# Patient Record
Sex: MALE | Race: WHITE | NOT HISPANIC OR LATINO | URBAN - METROPOLITAN AREA
[De-identification: names, ages, dates, MRNs, and addresses within clinical notes are randomized per-mention and may not be internally consistent; named-entity substitution may affect disease eponyms.]

---

## 2023-04-06 DIAGNOSIS — C18.9 COLON CANCER: Primary | ICD-10-CM

## 2023-05-04 ENCOUNTER — OFFICE VISIT (OUTPATIENT)
Dept: HEMATOLOGY/ONCOLOGY | Facility: CLINIC | Age: 68
End: 2023-05-04
Payer: MEDICARE

## 2023-05-04 VITALS
RESPIRATION RATE: 18 BRPM | BODY MASS INDEX: 32.44 KG/M2 | TEMPERATURE: 99 F | DIASTOLIC BLOOD PRESSURE: 53 MMHG | HEIGHT: 66 IN | HEART RATE: 63 BPM | OXYGEN SATURATION: 96 % | SYSTOLIC BLOOD PRESSURE: 102 MMHG | WEIGHT: 201.88 LBS

## 2023-05-04 DIAGNOSIS — C20 RECTAL CANCER: ICD-10-CM

## 2023-05-04 DIAGNOSIS — C18.9 COLON CANCER: ICD-10-CM

## 2023-05-04 PROCEDURE — 4010F PR ACE/ARB THEARPY RXD/TAKEN: ICD-10-PCS | Mod: CPTII,,, | Performed by: STUDENT IN AN ORGANIZED HEALTH CARE EDUCATION/TRAINING PROGRAM

## 2023-05-04 PROCEDURE — 1101F PT FALLS ASSESS-DOCD LE1/YR: CPT | Mod: CPTII,,, | Performed by: STUDENT IN AN ORGANIZED HEALTH CARE EDUCATION/TRAINING PROGRAM

## 2023-05-04 PROCEDURE — 3074F PR MOST RECENT SYSTOLIC BLOOD PRESSURE < 130 MM HG: ICD-10-PCS | Mod: CPTII,,, | Performed by: STUDENT IN AN ORGANIZED HEALTH CARE EDUCATION/TRAINING PROGRAM

## 2023-05-04 PROCEDURE — 3008F BODY MASS INDEX DOCD: CPT | Mod: CPTII,,, | Performed by: STUDENT IN AN ORGANIZED HEALTH CARE EDUCATION/TRAINING PROGRAM

## 2023-05-04 PROCEDURE — 1159F PR MEDICATION LIST DOCUMENTED IN MEDICAL RECORD: ICD-10-PCS | Mod: CPTII,,, | Performed by: STUDENT IN AN ORGANIZED HEALTH CARE EDUCATION/TRAINING PROGRAM

## 2023-05-04 PROCEDURE — 99205 PR OFFICE/OUTPT VISIT, NEW, LEVL V, 60-74 MIN: ICD-10-PCS | Mod: ,,, | Performed by: STUDENT IN AN ORGANIZED HEALTH CARE EDUCATION/TRAINING PROGRAM

## 2023-05-04 PROCEDURE — 1125F PR PAIN SEVERITY QUANTIFIED, PAIN PRESENT: ICD-10-PCS | Mod: CPTII,,, | Performed by: STUDENT IN AN ORGANIZED HEALTH CARE EDUCATION/TRAINING PROGRAM

## 2023-05-04 PROCEDURE — 3078F PR MOST RECENT DIASTOLIC BLOOD PRESSURE < 80 MM HG: ICD-10-PCS | Mod: CPTII,,, | Performed by: STUDENT IN AN ORGANIZED HEALTH CARE EDUCATION/TRAINING PROGRAM

## 2023-05-04 PROCEDURE — 3008F PR BODY MASS INDEX (BMI) DOCUMENTED: ICD-10-PCS | Mod: CPTII,,, | Performed by: STUDENT IN AN ORGANIZED HEALTH CARE EDUCATION/TRAINING PROGRAM

## 2023-05-04 PROCEDURE — 1101F PR PT FALLS ASSESS DOC 0-1 FALLS W/OUT INJ PAST YR: ICD-10-PCS | Mod: CPTII,,, | Performed by: STUDENT IN AN ORGANIZED HEALTH CARE EDUCATION/TRAINING PROGRAM

## 2023-05-04 PROCEDURE — 1159F MED LIST DOCD IN RCRD: CPT | Mod: CPTII,,, | Performed by: STUDENT IN AN ORGANIZED HEALTH CARE EDUCATION/TRAINING PROGRAM

## 2023-05-04 PROCEDURE — 3074F SYST BP LT 130 MM HG: CPT | Mod: CPTII,,, | Performed by: STUDENT IN AN ORGANIZED HEALTH CARE EDUCATION/TRAINING PROGRAM

## 2023-05-04 PROCEDURE — 3288F PR FALLS RISK ASSESSMENT DOCUMENTED: ICD-10-PCS | Mod: CPTII,,, | Performed by: STUDENT IN AN ORGANIZED HEALTH CARE EDUCATION/TRAINING PROGRAM

## 2023-05-04 PROCEDURE — 3078F DIAST BP <80 MM HG: CPT | Mod: CPTII,,, | Performed by: STUDENT IN AN ORGANIZED HEALTH CARE EDUCATION/TRAINING PROGRAM

## 2023-05-04 PROCEDURE — 99205 OFFICE O/P NEW HI 60 MIN: CPT | Mod: ,,, | Performed by: STUDENT IN AN ORGANIZED HEALTH CARE EDUCATION/TRAINING PROGRAM

## 2023-05-04 PROCEDURE — 1125F AMNT PAIN NOTED PAIN PRSNT: CPT | Mod: CPTII,,, | Performed by: STUDENT IN AN ORGANIZED HEALTH CARE EDUCATION/TRAINING PROGRAM

## 2023-05-04 PROCEDURE — 4010F ACE/ARB THERAPY RXD/TAKEN: CPT | Mod: CPTII,,, | Performed by: STUDENT IN AN ORGANIZED HEALTH CARE EDUCATION/TRAINING PROGRAM

## 2023-05-04 PROCEDURE — 3288F FALL RISK ASSESSMENT DOCD: CPT | Mod: CPTII,,, | Performed by: STUDENT IN AN ORGANIZED HEALTH CARE EDUCATION/TRAINING PROGRAM

## 2023-05-04 RX ORDER — ALBUTEROL SULFATE 90 UG/1
1 AEROSOL, METERED RESPIRATORY (INHALATION) EVERY 4 HOURS PRN
COMMUNITY
Start: 2023-04-04

## 2023-05-04 RX ORDER — ALBUTEROL SULFATE 1.25 MG/3ML
SOLUTION RESPIRATORY (INHALATION) EVERY 8 HOURS
COMMUNITY
Start: 2022-11-17

## 2023-05-04 RX ORDER — FLUTICASONE PROPIONATE 50 MCG
SPRAY, SUSPENSION (ML) NASAL
COMMUNITY
Start: 2023-04-21

## 2023-05-04 RX ORDER — FERROUS SULFATE TAB 325 MG (65 MG ELEMENTAL FE) 325 (65 FE) MG
TAB ORAL
COMMUNITY
Start: 2023-03-01

## 2023-05-04 RX ORDER — LISINOPRIL 10 MG/1
10 TABLET ORAL
COMMUNITY
Start: 2023-04-18

## 2023-05-04 RX ORDER — FOLIC ACID 1 MG/1
1000 TABLET ORAL
COMMUNITY
Start: 2023-04-27

## 2023-05-04 RX ORDER — APIXABAN 5 MG/1
5 TABLET, FILM COATED ORAL 2 TIMES DAILY
COMMUNITY
Start: 2023-04-13

## 2023-05-04 RX ORDER — AMIODARONE HYDROCHLORIDE 200 MG/1
200 TABLET ORAL
COMMUNITY
Start: 2023-04-13

## 2023-05-04 RX ORDER — PRAVASTATIN SODIUM 40 MG/1
40 TABLET ORAL
COMMUNITY
Start: 2023-04-24

## 2023-05-04 RX ORDER — TIOTROPIUM BROMIDE AND OLODATEROL 3.124; 2.736 UG/1; UG/1
SPRAY, METERED RESPIRATORY (INHALATION)
COMMUNITY
Start: 2023-04-21

## 2023-05-04 RX ORDER — FUROSEMIDE 40 MG/1
TABLET ORAL
COMMUNITY
Start: 2023-03-25

## 2023-05-04 NOTE — PROGRESS NOTES
Referring provider : Dr. Morse   Reason for consultation : Colon cancer     Diagnosis:  Rectal adenocarcinoma, pT3 pN2b MX, at least stage IIIC.      Treatment history    01/23/2023: low anterior resection    Current treatment: N/A    HPI     67-year-old who had a screening colonoscopy in January 2023 when he was found to have a obstructing mass at 12 cm, biopsy from which revealed invasive adenocarcinoma, concerning for malignancy.  He underwent a low anterior resection on 01/23/2023, pathology from which revealed invasive adenocarcinoma, pT3 pN2b.  Patient's postop course was complicated with anastomotic leak assess delayed wound healing.  He was referred to our clinic to discuss further treatment recommendations given his diagnosis of stage III rectal cancer.  Patient presented to my clinic on 05/04/2023 to establish care.    Interval history     Today, 05/04/2023, patient denies any acute concerns except for mild abdominal pain which is controlled on tramadol.  He denies any other acute concerns.  He denies any blood in his ostomy.  He denies any decreased appetite, weight loss, new foci of pain.      Pathology  01/30/2023 upper rectum/lower sigmoid colon resection:  Invasive adenocarcinoma, moderately differentiated, tumor invading the perirectal soft tissue, margins negative for tumor.  Ten of 38 lymph nodes positive for metastatic disease.  [id number]      02/06/2023 sigmoid colon resection-portion of perforated colon showing focal mucosal ischemia, vascular congestion, acute inflammation hemorrhage and acute serositis.  Surgical margins showing viable colon with acute serositis.  Three benign lymph nodes negative for tumor.  Negative for malignancy.      Imaging    02/08/23: Recent  intestinal surgery with placement of left sided colostomy and a surgical drain.  Free intraperitoneal air has resolved although scattered free fluid remains.  There is no bowel obstruction or significant bowel dilatation.  No  abscess of other organized fluid collection.  New small pleural effusions with greater atelectasis in each lung base.  Superimposed lower lobe infiltrate is possible bilaterally.  Worsening body wall edema.      Past Medical History:   Diagnosis Date    Colon cancer     Hypertension     Mixed hyperlipidemia        History reviewed. No pertinent surgical history.    History reviewed. No pertinent family history.    Social History     Socioeconomic History    Marital status:        Current Outpatient Medications   Medication Sig Dispense Refill    albuterol (ACCUNEB) 1.25 mg/3 mL Nebu Take by nebulization every 8 (eight) hours.      albuterol (PROVENTIL/VENTOLIN HFA) 90 mcg/actuation inhaler 1 puff every 4 (four) hours as needed.      amiodarone (PACERONE) 200 MG Tab Take 200 mg by mouth.      ELIQUIS 5 mg Tab Take 5 mg by mouth 2 (two) times daily.      FEROSUL 325 mg (65 mg iron) Tab tablet Take by mouth.      fluticasone propionate (FLONASE) 50 mcg/actuation nasal spray SMARTSI Spray(s) Both Nares Every Morning      folic acid (FOLVITE) 1 MG tablet Take 1,000 mcg by mouth.      furosemide (LASIX) 40 MG tablet SMARTSI Tablet(s) By Mouth Twice a Week PRN      lisinopriL 10 MG tablet Take 10 mg by mouth.      pravastatin (PRAVACHOL) 40 MG tablet Take 40 mg by mouth.      STIOLTO RESPIMAT 2.5-2.5 mcg/actuation Mist SMARTSI Puff(s) Via Inhaler Daily       No current facility-administered medications for this visit.       Review of patient's allergies indicates:  No Known Allergies      Review of systems     CONSTITUTIONAL: no fevers, no chills, no weight loss, no fatigue, no weakness  HEMATOLOGIC: no abnormal bleeding, no abnormal bruising, no drenching night sweats  ONCOLOGIC: no new masses or lumps  HEENT: no vision loss, no tinnitus or hearing loss, no nose bleeding, no dysphagia, no odynophagia  CVS: no chest pain, no palpitations, no dyspnea on exertion  RESP: no shortness of breath, no hemoptysis,  no cough  GI: no nausea, no vomiting, no diarrhea, no constipation, no melena, no hematochezia, no hematemesis, no abdominal pain, no increase in abdominal girth  : no dysuria, no hematuria, no hesitancy, no scrotal swelling, no discharge  INTEGUMENT: no rashes, no abnormal bruising, no nail pitting, no hyperpigmentation  NEURO: no falls, no memory loss, no paresthesias or dysesthesias, no urofecal incontinence or retention, no loss of strength on any extremity  MSK: no back pain, no new joint pain, no joint swelling  PSYCH: no suicidal or homicidal ideation, no depression, no insomnia, no anhedonia  ENDOCRINE: no heat or cold intolerance, no polyuria, no polydipsia      Physical exam     Vitals:    05/04/23 1145   BP: (!) 102/53   Pulse:    Resp:    Temp:      ECOG PS 0  GA: AAOx3, NAD, walks with a walker  HEENT: NCAT, moist oral mucous membranes  LYMPH: no cervical, axillary or supraclavicular adenopathy  CVS: s1s2 RRR, no M/R/G  RESP: CTA b/l, no crackles, no wheezes or rhonchi  ABD: soft, NT, ND, BS+, no hepatosplenomegaly, ostomy in place, healthy stoma.  In incision site with healing shallow wound.  No discharge or surrounding edema.  EXT: no deformities, no pedal edema  SKIN: no rashes, warm and dry  NEURO: normal mentation, strength 5/5 on all 4 extremities, no sensory deficits        Assessment and plan     # Rectal adenocarcinoma, pT3 pN2b MX, at least stage IIIC.  Noted operative report from January 2023 with findings of a palpable mass at peritoneal reflection.  Patient had surgical complications with concerns for perforation since anastomotic leak in February 2023 during another surgery status post sigmoid colon resection.  Status post low anterior resection, anastomotic disruption diverting colostomy, with delayed wound healing.  Discussed with patient and his daughter the diagnosis of rectal adenocarcinoma, treatment recommendations including chemotherapy, chemoradiation therapy and surgical  resection.  Patient has had complications from his surgical resection , now with delayed wound healing.  He was 13 weeks postop during his initial consultation with me.    I do not believe he would benefit from any adjuvant chemotherapy or chemoradiation therapy at this point especially since he is still healing.    I will get an expedited radiation oncology consultation for their opinion as well.  Discussed the plan to obtain restaging scans with a CT chest abdomen pelvis IV contrast and blood work including CEA today        Plan   CT chest abdomen pelvis with IV contrast   CBC, CMP, CEA today  Radiation oncology referral   Plan to follow-up in 2-3 weeks to discuss above workup and further recommendations.      A total of  60 minutes were spent in review of records, interpretation of test, coordination of care, discussion and counseling with the patient.          Portions of the record may have been created with voice recognition software. Occasional wrong-word or sound-a-like substitutions may have occurred due to the inherent limitations of voice recognition software. Read the chart carefully and recognize, using context, where substitutions have occurred.

## 2023-05-15 ENCOUNTER — OFFICE VISIT (OUTPATIENT)
Dept: HEMATOLOGY/ONCOLOGY | Facility: CLINIC | Age: 68
End: 2023-05-15
Payer: MEDICARE

## 2023-05-15 VITALS
HEART RATE: 66 BPM | HEIGHT: 66 IN | OXYGEN SATURATION: 99 % | DIASTOLIC BLOOD PRESSURE: 55 MMHG | BODY MASS INDEX: 30.44 KG/M2 | WEIGHT: 189.38 LBS | SYSTOLIC BLOOD PRESSURE: 92 MMHG

## 2023-05-15 DIAGNOSIS — C20 RECTAL CANCER: Primary | ICD-10-CM

## 2023-05-15 PROCEDURE — 3074F SYST BP LT 130 MM HG: CPT | Mod: CPTII,,, | Performed by: NURSE PRACTITIONER

## 2023-05-15 PROCEDURE — 4010F PR ACE/ARB THEARPY RXD/TAKEN: ICD-10-PCS | Mod: CPTII,,, | Performed by: NURSE PRACTITIONER

## 2023-05-15 PROCEDURE — 1126F AMNT PAIN NOTED NONE PRSNT: CPT | Mod: CPTII,,, | Performed by: NURSE PRACTITIONER

## 2023-05-15 PROCEDURE — 1101F PT FALLS ASSESS-DOCD LE1/YR: CPT | Mod: CPTII,,, | Performed by: NURSE PRACTITIONER

## 2023-05-15 PROCEDURE — 3288F FALL RISK ASSESSMENT DOCD: CPT | Mod: CPTII,,, | Performed by: NURSE PRACTITIONER

## 2023-05-15 PROCEDURE — 3288F PR FALLS RISK ASSESSMENT DOCUMENTED: ICD-10-PCS | Mod: CPTII,,, | Performed by: NURSE PRACTITIONER

## 2023-05-15 PROCEDURE — 1101F PR PT FALLS ASSESS DOC 0-1 FALLS W/OUT INJ PAST YR: ICD-10-PCS | Mod: CPTII,,, | Performed by: NURSE PRACTITIONER

## 2023-05-15 PROCEDURE — 3078F PR MOST RECENT DIASTOLIC BLOOD PRESSURE < 80 MM HG: ICD-10-PCS | Mod: CPTII,,, | Performed by: NURSE PRACTITIONER

## 2023-05-15 PROCEDURE — 1160F PR REVIEW ALL MEDS BY PRESCRIBER/CLIN PHARMACIST DOCUMENTED: ICD-10-PCS | Mod: CPTII,,, | Performed by: NURSE PRACTITIONER

## 2023-05-15 PROCEDURE — 1159F MED LIST DOCD IN RCRD: CPT | Mod: CPTII,,, | Performed by: NURSE PRACTITIONER

## 2023-05-15 PROCEDURE — 3008F BODY MASS INDEX DOCD: CPT | Mod: CPTII,,, | Performed by: NURSE PRACTITIONER

## 2023-05-15 PROCEDURE — 99214 PR OFFICE/OUTPT VISIT, EST, LEVL IV, 30-39 MIN: ICD-10-PCS | Mod: ,,, | Performed by: NURSE PRACTITIONER

## 2023-05-15 PROCEDURE — 1160F RVW MEDS BY RX/DR IN RCRD: CPT | Mod: CPTII,,, | Performed by: NURSE PRACTITIONER

## 2023-05-15 PROCEDURE — 3078F DIAST BP <80 MM HG: CPT | Mod: CPTII,,, | Performed by: NURSE PRACTITIONER

## 2023-05-15 PROCEDURE — 99214 OFFICE O/P EST MOD 30 MIN: CPT | Mod: ,,, | Performed by: NURSE PRACTITIONER

## 2023-05-15 PROCEDURE — 3074F PR MOST RECENT SYSTOLIC BLOOD PRESSURE < 130 MM HG: ICD-10-PCS | Mod: CPTII,,, | Performed by: NURSE PRACTITIONER

## 2023-05-15 PROCEDURE — 1126F PR PAIN SEVERITY QUANTIFIED, NO PAIN PRESENT: ICD-10-PCS | Mod: CPTII,,, | Performed by: NURSE PRACTITIONER

## 2023-05-15 PROCEDURE — 3008F PR BODY MASS INDEX (BMI) DOCUMENTED: ICD-10-PCS | Mod: CPTII,,, | Performed by: NURSE PRACTITIONER

## 2023-05-15 PROCEDURE — 4010F ACE/ARB THERAPY RXD/TAKEN: CPT | Mod: CPTII,,, | Performed by: NURSE PRACTITIONER

## 2023-05-15 PROCEDURE — 1159F PR MEDICATION LIST DOCUMENTED IN MEDICAL RECORD: ICD-10-PCS | Mod: CPTII,,, | Performed by: NURSE PRACTITIONER

## 2023-05-15 NOTE — PROGRESS NOTES
Referring provider : Dr. Morse   Reason for consultation : Colon cancer     Diagnosis:  Rectal adenocarcinoma, pT3 pN2b MX, at least stage IIIC.      Treatment history    01/23/2023: low anterior resection    Current treatment: N/A    HPI     67-year-old who had a screening colonoscopy in January 2023 when he was found to have a obstructing mass at 12 cm, biopsy from which revealed invasive adenocarcinoma, concerning for malignancy.  He underwent a low anterior resection on 01/23/2023, pathology from which revealed invasive adenocarcinoma, pT3 pN2b.  Patient's postop course was complicated with anastomotic leak assess delayed wound healing.  He was referred to our clinic to discuss further treatment recommendations given his diagnosis of stage III rectal cancer.  Patient presented to my clinic on 05/04/2023 to establish care.    Interval history     Today, 05/15/2023, patient denies any acute concerns except for mild abdominal pain which is controlled on tramadol.  He denies any other acute concerns.  He denies any blood in his ostomy.  He denies any decreased appetite, weight loss, new foci of pain.      Pathology  01/30/2023 upper rectum/lower sigmoid colon resection:  Invasive adenocarcinoma, moderately differentiated, tumor invading the perirectal soft tissue, margins negative for tumor.  Ten of 38 lymph nodes positive for metastatic disease.  [id number]      02/06/2023 sigmoid colon resection-portion of perforated colon showing focal mucosal ischemia, vascular congestion, acute inflammation hemorrhage and acute serositis.  Surgical margins showing viable colon with acute serositis.  Three benign lymph nodes negative for tumor.  Negative for malignancy.      Imaging    02/08/23: Recent  intestinal surgery with placement of left sided colostomy and a surgical drain.  Free intraperitoneal air has resolved although scattered free fluid remains.  There is no bowel obstruction or significant bowel dilatation.  No  abscess of other organized fluid collection.  New small pleural effusions with greater atelectasis in each lung base.  Superimposed lower lobe infiltrate is possible bilaterally.  Worsening body wall edema.  05/10/23: CT CAP: chest: There are small bilateral pleural effusions and associated compressive atelectasis and minimal pericardial effusion. No other significant change. No evidence of metastatic disease to the chest. AP: Persistent air and fluid collection in the left lower quadrant intimately associated with the posterior margin of the adjacent distal colon as well as the region of the surgical suture in the distal colonic segment. 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       Past Medical History:   Diagnosis Date    Colon cancer     Hypertension     Mixed hyperlipidemia        History reviewed. No pertinent surgical history.    History reviewed. No pertinent family history.    Social History     Socioeconomic History    Marital status:    Tobacco Use    Smoking status: Never    Smokeless tobacco: Never   Substance and Sexual Activity    Alcohol use: Not Currently    Drug use: Never       Current Outpatient Medications   Medication Sig Dispense Refill    albuterol (ACCUNEB) 1.25 mg/3 mL Nebu Take by nebulization every 8 (eight) hours.      albuterol (PROVENTIL/VENTOLIN HFA) 90 mcg/actuation inhaler 1 puff every 4 (four) hours as needed.      amiodarone (PACERONE) 200 MG Tab Take 200 mg by mouth.      ELIQUIS 5 mg Tab Take 5 mg by mouth 2 (two) times daily.      FEROSUL 325 mg (65 mg iron) Tab tablet Take by mouth.      fluticasone propionate (FLONASE) 50 mcg/actuation nasal spray SMARTSI Spray(s) Both Nares Every Morning      folic acid (FOLVITE) 1 MG tablet Take 1,000 mcg by mouth.      furosemide (LASIX) 40 MG tablet SMARTSI Tablet(s) By Mouth Twice a Week PRN      lisinopriL 10  MG tablet Take 10 mg by mouth.      pravastatin (PRAVACHOL) 40 MG tablet Take 40 mg by mouth.      STIOLTO RESPIMAT 2.5-2.5 mcg/actuation Mist SMARTSI Puff(s) Via Inhaler Daily       No current facility-administered medications for this visit.       Review of patient's allergies indicates:  No Known Allergies      Review of systems     CONSTITUTIONAL: no fevers, no chills, no weight loss, no fatigue, no weakness  HEMATOLOGIC: no abnormal bleeding, no abnormal bruising, no drenching night sweats  ONCOLOGIC: no new masses or lumps  HEENT: no vision loss, no tinnitus or hearing loss, no nose bleeding, no dysphagia, no odynophagia  CVS: no chest pain, no palpitations, no dyspnea on exertion  RESP: no shortness of breath, no hemoptysis, no cough  GI: no nausea, no vomiting, no diarrhea, no constipation, no melena, no hematochezia, no hematemesis, no abdominal pain, no increase in abdominal girth  : no dysuria, no hematuria, no hesitancy, no scrotal swelling, no discharge  INTEGUMENT: no rashes, no abnormal bruising, no nail pitting, no hyperpigmentation  NEURO: no falls, no memory loss, no paresthesias or dysesthesias, no urofecal incontinence or retention, no loss of strength on any extremity  MSK: no back pain, no new joint pain, no joint swelling  PSYCH: no suicidal or homicidal ideation, no depression, no insomnia, no anhedonia  ENDOCRINE: no heat or cold intolerance, no polyuria, no polydipsia      Physical exam     Vitals:    05/15/23 0937   BP: (!) 92/55   Pulse:      ECOG PS 0  GA: AAOx3, NAD, walks with a walker  HEENT: NCAT, moist oral mucous membranes  LYMPH: no cervical, axillary or supraclavicular adenopathy  CVS: s1s2 RRR, no M/R/G  RESP: CTA b/l, no crackles, no wheezes or rhonchi  ABD: soft, NT, ND, BS+, no hepatosplenomegaly, ostomy in place, healthy stoma.  In incision site with healing shallow wound.  No discharge or surrounding edema.  EXT: no deformities, no pedal edema  SKIN: no rashes, warm  and dry  NEURO: normal mentation, strength 5/5 on all 4 extremities, no sensory deficits        Assessment and plan     # Rectal adenocarcinoma, pT3 pN2b MX, at least stage IIIC.  Noted operative report from January 2023 with findings of a palpable mass at peritoneal reflection.  Patient had surgical complications with concerns for perforation since anastomotic leak in February 2023 during another surgery status post sigmoid colon resection.  Status post low anterior resection, anastomotic disruption diverting colostomy, with delayed wound healing.  Discussed with patient and his daughter the diagnosis of rectal adenocarcinoma, treatment recommendations including chemotherapy, chemoradiation therapy and surgical resection.  Patient has had complications from his surgical resection , now with delayed wound healing.  He was 13 weeks postop during his initial consultation with me.    I do not believe he would benefit from any adjuvant chemotherapy or chemoradiation therapy at this point especially since he is still healing.    I will get an expedited radiation oncology consultation for their opinion as well.  Discussed the plan to obtain restaging scans with a CT chest abdomen pelvis IV contrast and blood work including CEA today        Plan   Pt will let us know if he decides to pursue receiving radiation  Will return for chemo education- Xeloda once radiation schedule has been established   Seen by Radiation oncology has not started pt needs an abscess drained by IR   Low BP- asymptomatic- he has follow-up with cardiologist tomorrow 5/16/23  Plan to follow-up in 2-3 weeks to discuss above workup and further recommendations.        Pt instructed to call in the interim for any new or worsening concerns or symptoms      MARGARETTE Tracy

## 2023-06-05 ENCOUNTER — OFFICE VISIT (OUTPATIENT)
Dept: HEMATOLOGY/ONCOLOGY | Facility: CLINIC | Age: 68
End: 2023-06-05
Payer: MEDICARE

## 2023-06-05 VITALS
HEIGHT: 66 IN | TEMPERATURE: 99 F | SYSTOLIC BLOOD PRESSURE: 125 MMHG | WEIGHT: 197.88 LBS | HEART RATE: 59 BPM | DIASTOLIC BLOOD PRESSURE: 65 MMHG | OXYGEN SATURATION: 98 % | BODY MASS INDEX: 31.8 KG/M2 | RESPIRATION RATE: 18 BRPM

## 2023-06-05 DIAGNOSIS — C20 RECTAL CANCER: Primary | ICD-10-CM

## 2023-06-05 PROCEDURE — 4010F PR ACE/ARB THEARPY RXD/TAKEN: ICD-10-PCS | Mod: CPTII,,, | Performed by: STUDENT IN AN ORGANIZED HEALTH CARE EDUCATION/TRAINING PROGRAM

## 2023-06-05 PROCEDURE — 3008F PR BODY MASS INDEX (BMI) DOCUMENTED: ICD-10-PCS | Mod: CPTII,,, | Performed by: STUDENT IN AN ORGANIZED HEALTH CARE EDUCATION/TRAINING PROGRAM

## 2023-06-05 PROCEDURE — 3288F FALL RISK ASSESSMENT DOCD: CPT | Mod: CPTII,,, | Performed by: STUDENT IN AN ORGANIZED HEALTH CARE EDUCATION/TRAINING PROGRAM

## 2023-06-05 PROCEDURE — 1159F PR MEDICATION LIST DOCUMENTED IN MEDICAL RECORD: ICD-10-PCS | Mod: CPTII,,, | Performed by: STUDENT IN AN ORGANIZED HEALTH CARE EDUCATION/TRAINING PROGRAM

## 2023-06-05 PROCEDURE — 99214 OFFICE O/P EST MOD 30 MIN: CPT | Mod: ,,, | Performed by: STUDENT IN AN ORGANIZED HEALTH CARE EDUCATION/TRAINING PROGRAM

## 2023-06-05 PROCEDURE — 3288F PR FALLS RISK ASSESSMENT DOCUMENTED: ICD-10-PCS | Mod: CPTII,,, | Performed by: STUDENT IN AN ORGANIZED HEALTH CARE EDUCATION/TRAINING PROGRAM

## 2023-06-05 PROCEDURE — 3074F SYST BP LT 130 MM HG: CPT | Mod: CPTII,,, | Performed by: STUDENT IN AN ORGANIZED HEALTH CARE EDUCATION/TRAINING PROGRAM

## 2023-06-05 PROCEDURE — 1101F PT FALLS ASSESS-DOCD LE1/YR: CPT | Mod: CPTII,,, | Performed by: STUDENT IN AN ORGANIZED HEALTH CARE EDUCATION/TRAINING PROGRAM

## 2023-06-05 PROCEDURE — 3074F PR MOST RECENT SYSTOLIC BLOOD PRESSURE < 130 MM HG: ICD-10-PCS | Mod: CPTII,,, | Performed by: STUDENT IN AN ORGANIZED HEALTH CARE EDUCATION/TRAINING PROGRAM

## 2023-06-05 PROCEDURE — 1125F PR PAIN SEVERITY QUANTIFIED, PAIN PRESENT: ICD-10-PCS | Mod: CPTII,,, | Performed by: STUDENT IN AN ORGANIZED HEALTH CARE EDUCATION/TRAINING PROGRAM

## 2023-06-05 PROCEDURE — 99214 PR OFFICE/OUTPT VISIT, EST, LEVL IV, 30-39 MIN: ICD-10-PCS | Mod: ,,, | Performed by: STUDENT IN AN ORGANIZED HEALTH CARE EDUCATION/TRAINING PROGRAM

## 2023-06-05 PROCEDURE — 3078F PR MOST RECENT DIASTOLIC BLOOD PRESSURE < 80 MM HG: ICD-10-PCS | Mod: CPTII,,, | Performed by: STUDENT IN AN ORGANIZED HEALTH CARE EDUCATION/TRAINING PROGRAM

## 2023-06-05 PROCEDURE — 3078F DIAST BP <80 MM HG: CPT | Mod: CPTII,,, | Performed by: STUDENT IN AN ORGANIZED HEALTH CARE EDUCATION/TRAINING PROGRAM

## 2023-06-05 PROCEDURE — 4010F ACE/ARB THERAPY RXD/TAKEN: CPT | Mod: CPTII,,, | Performed by: STUDENT IN AN ORGANIZED HEALTH CARE EDUCATION/TRAINING PROGRAM

## 2023-06-05 PROCEDURE — 1159F MED LIST DOCD IN RCRD: CPT | Mod: CPTII,,, | Performed by: STUDENT IN AN ORGANIZED HEALTH CARE EDUCATION/TRAINING PROGRAM

## 2023-06-05 PROCEDURE — 1125F AMNT PAIN NOTED PAIN PRSNT: CPT | Mod: CPTII,,, | Performed by: STUDENT IN AN ORGANIZED HEALTH CARE EDUCATION/TRAINING PROGRAM

## 2023-06-05 PROCEDURE — 1101F PR PT FALLS ASSESS DOC 0-1 FALLS W/OUT INJ PAST YR: ICD-10-PCS | Mod: CPTII,,, | Performed by: STUDENT IN AN ORGANIZED HEALTH CARE EDUCATION/TRAINING PROGRAM

## 2023-06-05 PROCEDURE — 3008F BODY MASS INDEX DOCD: CPT | Mod: CPTII,,, | Performed by: STUDENT IN AN ORGANIZED HEALTH CARE EDUCATION/TRAINING PROGRAM

## 2023-06-05 RX ORDER — FERROUS SULFATE 325(65) MG
TABLET, DELAYED RELEASE (ENTERIC COATED) ORAL
COMMUNITY
Start: 2022-07-22

## 2023-06-05 RX ORDER — TOPIRAMATE 100 MG/1
TABLET, FILM COATED ORAL
COMMUNITY

## 2023-06-05 RX ORDER — COLLAGENASE SANTYL 250 [ARB'U]/G
OINTMENT TOPICAL
COMMUNITY
Start: 2023-04-18

## 2023-06-05 RX ORDER — ALPRAZOLAM 0.5 MG/1
TABLET ORAL
COMMUNITY
Start: 2023-03-28

## 2023-06-05 NOTE — PROGRESS NOTES
Referring provider : Dr. Morse   Reason for consultation : Colon cancer     Diagnosis:  Rectal adenocarcinoma, pT3 pN2b MX, at least stage IIIC.      Treatment history    01/23/2023: low anterior resection    Current treatment: N/A    HPI     67-year-old who had a screening colonoscopy in January 2023 when he was found to have a obstructing mass at 12 cm, biopsy from which revealed invasive adenocarcinoma, concerning for malignancy.  He underwent a low anterior resection on 01/23/2023, pathology from which revealed invasive adenocarcinoma, pT3 pN2b.  Patient's postop course was complicated with anastomotic leak assess delayed wound healing.  He was referred to our clinic to discuss further treatment recommendations given his diagnosis of stage III rectal cancer.  Patient presented to my clinic on 05/04/2023 to establish care.    Interval history     Since his initial visit with me, patient was seen by radiation oncology with recommendations for concurrent chemo RT.  Following this he was found to have a abscess requiring drainage tube placement with IR.  Today, 06/05/2023, patient denies any acute concerns today.  He reports that his drain was pulled by a bit last week.  He denies any new foci of pain.  He reports persistent purulent discharge in his drain.  He denies any fevers, chills or change in his bowel habits or blood in his stools.    Pathology  01/30/2023 upper rectum/lower sigmoid colon resection:  Invasive adenocarcinoma, moderately differentiated, tumor invading the perirectal soft tissue, margins negative for tumor.  Ten of 38 lymph nodes positive for metastatic disease.  [id number]      02/06/2023 sigmoid colon resection-portion of perforated colon showing focal mucosal ischemia, vascular congestion, acute inflammation hemorrhage and acute serositis.  Surgical margins showing viable colon with acute serositis.  Three benign lymph nodes negative for tumor.  Negative for  malignancy.      Imaging    05/10/2023 CT chest abdomen pelvis with contrast:  Persistent air and fluid collection in the left anterior lower quadrant intermittently with posterior margin of the adjacent distal colon as well as region of the surgical suture in the distal colonic segment.  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  Overall there is more air unless fluid in this region in comparison to prior exam.  Development of borderline bilateral inguinal and mild retroperitoneal adenopathy.  Nonspecific wall thickening involving the distal colonic segment leading to left sided ostomy.  Possibility of colitis is considered.  This may be reactive secondary to adjacent inflammatory process.  No other significant change.    02/08/23: Recent  intestinal surgery with placement of left sided colostomy and a surgical drain.  Free intraperitoneal air has resolved although scattered free fluid remains.  There is no bowel obstruction or significant bowel dilatation.  No abscess of other organized fluid collection.  New small pleural effusions with greater atelectasis in each lung base.  Superimposed lower lobe infiltrate is possible bilaterally.  Worsening body wall edema.      Laboratory    05/05/2023:  Creatinine 0.83, albumin 2.5, calcium 8.8, alkaline phosphatase 55, total bili 0.9, AST 8, ALT less than 6, CEA 4.0, WBC count 14.6, hemoglobin 8.3, MCV 94.2, platelet count 3 1 8, ANC 8.96.    Past Medical History:   Diagnosis Date    Colon cancer     Hypertension     Mixed hyperlipidemia        History reviewed. No pertinent surgical history.    History reviewed. No pertinent family history.    Social History     Socioeconomic History    Marital status:    Tobacco Use    Smoking status: Never    Smokeless tobacco: Never   Substance and Sexual Activity    Alcohol use: Not Currently    Drug use: Never       Current  Outpatient Medications   Medication Sig Dispense Refill    albuterol (ACCUNEB) 1.25 mg/3 mL Nebu Take by nebulization every 8 (eight) hours.      albuterol (PROVENTIL/VENTOLIN HFA) 90 mcg/actuation inhaler 1 puff every 4 (four) hours as needed.      ALPRAZolam (XANAX) 0.5 MG tablet SMARTSI.5 Tablet(s) By Mouth      amiodarone (PACERONE) 200 MG Tab Take 200 mg by mouth.      ELIQUIS 5 mg Tab Take 5 mg by mouth 2 (two) times daily.      FEROSUL 325 mg (65 mg iron) Tab tablet Take by mouth.      ferrous sulfate 325 (65 FE) MG EC tablet 1 tablet Orally Once a day for 90 days      fluticasone propionate (FLONASE) 50 mcg/actuation nasal spray SMARTSI Spray(s) Both Nares Every Morning      folic acid (FOLVITE) 1 MG tablet Take 1,000 mcg by mouth.      furosemide (LASIX) 40 MG tablet SMARTSI Tablet(s) By Mouth Twice a Week PRN      lisinopriL 10 MG tablet Take 10 mg by mouth.      pravastatin (PRAVACHOL) 40 MG tablet Take 40 mg by mouth.      SANTYL ointment SMARTSIG:sparingly Topical Daily      STIOLTO RESPIMAT 2.5-2.5 mcg/actuation Mist SMARTSI Puff(s) Via Inhaler Daily      topiramate (TOPAMAX) 100 MG tablet 1 tablet Orally BID for 30 day(s)       No current facility-administered medications for this visit.       Review of patient's allergies indicates:  No Known Allergies      Review of systems     CONSTITUTIONAL: no fevers, no chills, no weight loss, no fatigue, no weakness  HEMATOLOGIC: no abnormal bleeding, no abnormal bruising, no drenching night sweats  ONCOLOGIC: no new masses or lumps  HEENT: no vision loss, no tinnitus or hearing loss, no nose bleeding, no dysphagia, no odynophagia  CVS: no chest pain, no palpitations, no dyspnea on exertion  RESP: no shortness of breath, no hemoptysis, no cough  GI: no nausea, no vomiting, no diarrhea, no constipation, no melena, no hematochezia, no hematemesis, no abdominal pain, no increase in abdominal girth  : no dysuria, no hematuria, no hesitancy, no scrotal  swelling, no discharge  INTEGUMENT: no rashes, no abnormal bruising, no nail pitting, no hyperpigmentation  NEURO: no falls, no memory loss, no paresthesias or dysesthesias, no urofecal incontinence or retention, no loss of strength on any extremity  MSK: no back pain, no new joint pain, no joint swelling  PSYCH: no suicidal or homicidal ideation, no depression, no insomnia, no anhedonia  ENDOCRINE: no heat or cold intolerance, no polyuria, no polydipsia      Physical exam     Vitals:    06/05/23 0938   BP: 125/65   Pulse: (!) 59   Resp: 18   Temp: 98.5 °F (36.9 °C)       ECOG PS 0  GA: AAOx3, NAD, walks with a walker  HEENT: NCAT, moist oral mucous membranes  LYMPH: no cervical, axillary or supraclavicular adenopathy  CVS: s1s2 RRR, no M/R/G  RESP: CTA b/l, no crackles, no wheezes or rhonchi  ABD: soft, NT, ND, BS+, no hepatosplenomegaly, ostomy in place, healthy stoma.  In incision site with healing shallow wound.  No discharge or surrounding edema.  Drain with purulent discharge  EXT: no deformities, no pedal edema  SKIN: no rashes, warm and dry  NEURO: normal mentation, strength 5/5 on all 4 extremities, no sensory deficits        Assessment and plan     # Rectal adenocarcinoma, pT3 pN2b MX, at least stage IIIC.  Noted operative report from January 2023 with findings of a palpable mass at peritoneal reflection.  Patient had surgical complications with concerns for perforation since anastomotic leak in February 2023 during another surgery status post sigmoid colon resection.  Status post low anterior resection, anastomotic disruption diverting colostomy, with delayed wound healing.  Discussed with patient and his daughter the diagnosis of rectal adenocarcinoma, treatment recommendations including chemotherapy, chemoradiation therapy and surgical resection.  Patient has had complications from his surgical resection , now with delayed wound healing.  He was 13 weeks postop during his initial consultation with me.     I do not believe he would benefit from any adjuvant chemotherapy or chemoradiation therapy at this point especially since he is still healing.    Status post radiation oncology consultation recommendations for concurrent chemo RT after treatment for abdominal wall abscess.  He is status post IR for drain placement.  Reviewed CT chest abdomen pelvis IV contrast on in May 2023 without obvious evidence of disease recurrence    Plan   Patient was decided not to do chemotherapy concurrently with radiation therapy.  He reports he will consider radiation therapy.  Advised patient to reach out to Interventional Radiology for considerations of drain replacement given his history of it coming out in the last week.    Patient will reach out to Dr. Morse as well   Plan to follow-up in 2 months with repeat CT chest abdomen pelvis with IV contrast and labs including CEA for surveillance      A total of  30 minutes were spent in review of records, interpretation of test, coordination of care, discussion and counseling with the patient.        Portions of the record may have been created with voice recognition software. Occasional wrong-word or sound-a-like substitutions may have occurred due to the inherent limitations of voice recognition software. Read the chart carefully and recognize, using context, where substitutions have occurred.

## 2023-08-14 ENCOUNTER — OFFICE VISIT (OUTPATIENT)
Dept: HEMATOLOGY/ONCOLOGY | Facility: CLINIC | Age: 68
End: 2023-08-14
Payer: MEDICARE

## 2023-08-14 VITALS
TEMPERATURE: 99 F | HEIGHT: 66 IN | DIASTOLIC BLOOD PRESSURE: 74 MMHG | BODY MASS INDEX: 34.34 KG/M2 | WEIGHT: 213.69 LBS | SYSTOLIC BLOOD PRESSURE: 124 MMHG | OXYGEN SATURATION: 97 % | HEART RATE: 62 BPM | RESPIRATION RATE: 18 BRPM

## 2023-08-14 DIAGNOSIS — C20 RECTAL CANCER: Primary | ICD-10-CM

## 2023-08-14 PROCEDURE — 99214 PR OFFICE/OUTPT VISIT, EST, LEVL IV, 30-39 MIN: ICD-10-PCS | Mod: ,,, | Performed by: STUDENT IN AN ORGANIZED HEALTH CARE EDUCATION/TRAINING PROGRAM

## 2023-08-14 PROCEDURE — 3078F DIAST BP <80 MM HG: CPT | Mod: CPTII,,, | Performed by: STUDENT IN AN ORGANIZED HEALTH CARE EDUCATION/TRAINING PROGRAM

## 2023-08-14 PROCEDURE — 1159F PR MEDICATION LIST DOCUMENTED IN MEDICAL RECORD: ICD-10-PCS | Mod: CPTII,,, | Performed by: STUDENT IN AN ORGANIZED HEALTH CARE EDUCATION/TRAINING PROGRAM

## 2023-08-14 PROCEDURE — 3288F PR FALLS RISK ASSESSMENT DOCUMENTED: ICD-10-PCS | Mod: CPTII,,, | Performed by: STUDENT IN AN ORGANIZED HEALTH CARE EDUCATION/TRAINING PROGRAM

## 2023-08-14 PROCEDURE — 4010F PR ACE/ARB THEARPY RXD/TAKEN: ICD-10-PCS | Mod: CPTII,,, | Performed by: STUDENT IN AN ORGANIZED HEALTH CARE EDUCATION/TRAINING PROGRAM

## 2023-08-14 PROCEDURE — 1126F AMNT PAIN NOTED NONE PRSNT: CPT | Mod: CPTII,,, | Performed by: STUDENT IN AN ORGANIZED HEALTH CARE EDUCATION/TRAINING PROGRAM

## 2023-08-14 PROCEDURE — 99214 OFFICE O/P EST MOD 30 MIN: CPT | Mod: ,,, | Performed by: STUDENT IN AN ORGANIZED HEALTH CARE EDUCATION/TRAINING PROGRAM

## 2023-08-14 PROCEDURE — 3074F PR MOST RECENT SYSTOLIC BLOOD PRESSURE < 130 MM HG: ICD-10-PCS | Mod: CPTII,,, | Performed by: STUDENT IN AN ORGANIZED HEALTH CARE EDUCATION/TRAINING PROGRAM

## 2023-08-14 PROCEDURE — 4010F ACE/ARB THERAPY RXD/TAKEN: CPT | Mod: CPTII,,, | Performed by: STUDENT IN AN ORGANIZED HEALTH CARE EDUCATION/TRAINING PROGRAM

## 2023-08-14 PROCEDURE — 3074F SYST BP LT 130 MM HG: CPT | Mod: CPTII,,, | Performed by: STUDENT IN AN ORGANIZED HEALTH CARE EDUCATION/TRAINING PROGRAM

## 2023-08-14 PROCEDURE — 3008F BODY MASS INDEX DOCD: CPT | Mod: CPTII,,, | Performed by: STUDENT IN AN ORGANIZED HEALTH CARE EDUCATION/TRAINING PROGRAM

## 2023-08-14 PROCEDURE — 1126F PR PAIN SEVERITY QUANTIFIED, NO PAIN PRESENT: ICD-10-PCS | Mod: CPTII,,, | Performed by: STUDENT IN AN ORGANIZED HEALTH CARE EDUCATION/TRAINING PROGRAM

## 2023-08-14 PROCEDURE — 3288F FALL RISK ASSESSMENT DOCD: CPT | Mod: CPTII,,, | Performed by: STUDENT IN AN ORGANIZED HEALTH CARE EDUCATION/TRAINING PROGRAM

## 2023-08-14 PROCEDURE — 3078F PR MOST RECENT DIASTOLIC BLOOD PRESSURE < 80 MM HG: ICD-10-PCS | Mod: CPTII,,, | Performed by: STUDENT IN AN ORGANIZED HEALTH CARE EDUCATION/TRAINING PROGRAM

## 2023-08-14 PROCEDURE — 3008F PR BODY MASS INDEX (BMI) DOCUMENTED: ICD-10-PCS | Mod: CPTII,,, | Performed by: STUDENT IN AN ORGANIZED HEALTH CARE EDUCATION/TRAINING PROGRAM

## 2023-08-14 PROCEDURE — 1101F PR PT FALLS ASSESS DOC 0-1 FALLS W/OUT INJ PAST YR: ICD-10-PCS | Mod: CPTII,,, | Performed by: STUDENT IN AN ORGANIZED HEALTH CARE EDUCATION/TRAINING PROGRAM

## 2023-08-14 PROCEDURE — 1101F PT FALLS ASSESS-DOCD LE1/YR: CPT | Mod: CPTII,,, | Performed by: STUDENT IN AN ORGANIZED HEALTH CARE EDUCATION/TRAINING PROGRAM

## 2023-08-14 PROCEDURE — 1159F MED LIST DOCD IN RCRD: CPT | Mod: CPTII,,, | Performed by: STUDENT IN AN ORGANIZED HEALTH CARE EDUCATION/TRAINING PROGRAM

## 2023-08-14 NOTE — PROGRESS NOTES
Referring provider : Dr. Morse   Reason for consultation : Colon cancer     Diagnosis:  Rectal adenocarcinoma, pT3 pN2b MX, at least stage IIIC.      Treatment history    01/23/2023: low anterior resection    Current treatment:    Surveillance    HPI     67-year-old who had a screening colonoscopy in January 2023 when he was found to have a obstructing mass at 12 cm, biopsy from which revealed invasive adenocarcinoma, concerning for malignancy.  He underwent a low anterior resection on 01/23/2023, pathology from which revealed invasive adenocarcinoma, pT3 pN2b.  Patient's postop course was complicated with anastomotic leak assess delayed wound healing.  He was referred to our clinic to discuss further treatment recommendations given his diagnosis of stage III rectal cancer.  Patient presented to my clinic on 05/04/2023 to establish care.  Patient declined adjuvant radiation therapy or chemotherapy.      Interval history     Today, 08/14/2023, patient denies any acute concerns today.  He reports healing well after drain removal.  He is walking independently without a walker.  He denies any falls.  He denies any blood in his stools or dark tarry stools.  He denies any new foci of pain.  He denies any new lumps or bumps.    Pathology  01/30/2023 upper rectum/lower sigmoid colon resection:  Invasive adenocarcinoma, moderately differentiated, tumor invading the perirectal soft tissue, margins negative for tumor.  Ten of 38 lymph nodes positive for metastatic disease.  [id number]      02/06/2023 sigmoid colon resection-portion of perforated colon showing focal mucosal ischemia, vascular congestion, acute inflammation hemorrhage and acute serositis.  Surgical margins showing viable colon with acute serositis.  Three benign lymph nodes negative for tumor.  Negative for malignancy.      Imaging    08/08/2023 CT chest abdomen pelvis with contrast:  Decrease in airspace opacity at the medial aspect of the right middle lobe  with residual changes most likely representing segmental atelectasis or scarring.  Resolution of bilateral pleural effusions.  Slight decrease in size of nonspecific subcarinal nodes, largest measuring 1 cm, no other significant change, resolution of air-fluid level in the left side of the abdomen with removal of surgical drain.  Stable retroperitoneal adenopathy.  Left lower quadrant periosteal hernia containing small bowel but not resulting obstruction.  Slight decrease in abdominal ascites.  No other significant change.      05/10/2023 CT chest abdomen pelvis with contrast:  Persistent air and fluid collection in the left anterior lower quadrant intermittently with posterior margin of the adjacent distal colon as well as region of the surgical suture in the distal colonic segment.  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  Overall there is more air unless fluid in this region in comparison to prior exam.  Development of borderline bilateral inguinal and mild retroperitoneal adenopathy.  Nonspecific wall thickening involving the distal colonic segment leading to left sided ostomy.  Possibility of colitis is considered.  This may be reactive secondary to adjacent inflammatory process.  No other significant change.    02/08/23: Recent  intestinal surgery with placement of left sided colostomy and a surgical drain.  Free intraperitoneal air has resolved although scattered free fluid remains.  There is no bowel obstruction or significant bowel dilatation.  No abscess of other organized fluid collection.  New small pleural effusions with greater atelectasis in each lung base.  Superimposed lower lobe infiltrate is possible bilaterally.  Worsening body wall edema.      Laboratory    08/11/2023:  CEA 2.5, Creatinine 0.98, albumin 3.7, calcium 9.3, LFTs within normal limits, WBC count 8.13, hemoglobin 11.2, MCV 92.7,  platelet count 225.    2023:  Creatinine 0.83, albumin 2.5, calcium 8.8, alkaline phosphatase 55, total bili 0.9, AST 8, ALT less than 6, CEA 4.0, WBC count 14.6, hemoglobin 8.3, MCV 94.2, platelet count 3 1 8, ANC 8.96.    Past Medical History:   Diagnosis Date    Colon cancer     Hypertension     Mixed hyperlipidemia        History reviewed. No pertinent surgical history.    History reviewed. No pertinent family history.    Social History     Socioeconomic History    Marital status:    Tobacco Use    Smoking status: Never    Smokeless tobacco: Never   Substance and Sexual Activity    Alcohol use: Not Currently    Drug use: Never       Current Outpatient Medications   Medication Sig Dispense Refill    albuterol (ACCUNEB) 1.25 mg/3 mL Nebu Take by nebulization every 8 (eight) hours.      albuterol (PROVENTIL/VENTOLIN HFA) 90 mcg/actuation inhaler 1 puff every 4 (four) hours as needed.      ALPRAZolam (XANAX) 0.5 MG tablet SMARTSI.5 Tablet(s) By Mouth      amiodarone (PACERONE) 200 MG Tab Take 200 mg by mouth.      ELIQUIS 5 mg Tab Take 5 mg by mouth 2 (two) times daily.      FEROSUL 325 mg (65 mg iron) Tab tablet Take by mouth.      ferrous sulfate 325 (65 FE) MG EC tablet 1 tablet Orally Once a day for 90 days      fluticasone propionate (FLONASE) 50 mcg/actuation nasal spray SMARTSI Spray(s) Both Nares Every Morning      folic acid (FOLVITE) 1 MG tablet Take 1,000 mcg by mouth.      furosemide (LASIX) 40 MG tablet SMARTSI Tablet(s) By Mouth Twice a Week PRN      lisinopriL 10 MG tablet Take 10 mg by mouth.      pravastatin (PRAVACHOL) 40 MG tablet Take 40 mg by mouth.      SANTYL ointment SMARTSIG:sparingly Topical Daily      STIOLTO RESPIMAT 2.5-2.5 mcg/actuation Mist SMARTSI Puff(s) Via Inhaler Daily      topiramate (TOPAMAX) 100 MG tablet 1 tablet Orally BID for 30 day(s)       No current facility-administered medications for this visit.       Review of patient's allergies  indicates:  No Known Allergies      Review of systems     CONSTITUTIONAL: no fevers, no chills, no weight loss, no fatigue, no weakness  HEMATOLOGIC: no abnormal bleeding, no abnormal bruising, no drenching night sweats  ONCOLOGIC: no new masses or lumps  HEENT: no vision loss, no tinnitus or hearing loss, no nose bleeding, no dysphagia, no odynophagia  CVS: no chest pain, no palpitations, no dyspnea on exertion  RESP: no shortness of breath, no hemoptysis, no cough  GI: no nausea, no vomiting, no diarrhea, no constipation, no melena, no hematochezia, no hematemesis, no abdominal pain, no increase in abdominal girth  : no dysuria, no hematuria, no hesitancy, no scrotal swelling, no discharge  INTEGUMENT: no rashes, no abnormal bruising, no nail pitting, no hyperpigmentation  NEURO: no falls, no memory loss, no paresthesias or dysesthesias, no urofecal incontinence or retention, no loss of strength on any extremity  MSK: no back pain, no new joint pain, no joint swelling  PSYCH: no suicidal or homicidal ideation, no depression, no insomnia, no anhedonia  ENDOCRINE: no heat or cold intolerance, no polyuria, no polydipsia      Physical exam     Vitals:    08/14/23 1402   BP: 124/74   Pulse: 62   Resp: 18   Temp: 98.7 °F (37.1 °C)       ECOG PS 0  GA: AAOx3, NAD  HEENT: NCAT, moist oral mucous membranes  LYMPH: no cervical, axillary or supraclavicular adenopathy  CVS: s1s2 RRR, no M/R/G  RESP: CTA b/l, no crackles, no wheezes or rhonchi  ABD: soft, NT, ND, BS+, no hepatosplenomegaly, ostomy in place, healthy stoma.   EXT: no deformities, no pedal edema  SKIN: no rashes, warm and dry  NEURO: normal mentation, strength 5/5 on all 4 extremities, no sensory deficits        Assessment and plan     # Rectal adenocarcinoma, pT3 pN2b MX, at least stage IIIC.  Noted operative report from January 2023 with findings of a palpable mass at peritoneal reflection.  Patient had surgical complications with concerns for perforation  since anastomotic leak in February 2023 during another surgery status post sigmoid colon resection.  Status post low anterior resection, anastomotic disruption diverting colostomy, with delayed wound healing.  Discussed with patient and his daughter the diagnosis of rectal adenocarcinoma, treatment recommendations including chemotherapy, chemoradiation therapy and surgical resection.  Patient has had complications from his surgical resection , now with delayed wound healing.  He was 13 weeks postop during his initial consultation with me.    I do not believe he would benefit from any adjuvant chemotherapy or chemoradiation therapy at this point especially since he is still healing.    Status post radiation oncology consultation recommendations for concurrent chemo RT after treatment for abdominal wall abscess.  He is status post IR for drain placement.  Reviewed CT chest abdomen pelvis IV contrast on in May 2023 without obvious evidence of disease recurrence  Patient was decided not to do chemotherapy concurrently with radiation therapy.    Will plan for active surveillance, with labs every 3-6 months for the 1st 2 years and every 6 months thereafter to complete a total of 5 years along with a CT chest abdomen pelvis with contrast every 6 months for a total of 5 years.        Plan   Plan to follow-up in 3 months with repeat labs including CEA   Follow-up with Dr. Head and Dr. Morse as scheduled.  Patient will need colonoscopy at 1 year from his surgery.    A total of  30 minutes were spent in review of records, interpretation of test, coordination of care, discussion and counseling with the patient.        Portions of the record may have been created with voice recognition software. Occasional wrong-word or sound-a-like substitutions may have occurred due to the inherent limitations of voice recognition software. Read the chart carefully and recognize, using context, where substitutions have occurred.

## 2023-11-14 NOTE — PROGRESS NOTES
Referring provider : Dr. Morse   Reason for consultation : Colon cancer     Diagnosis:  Rectal adenocarcinoma, pT3 pN2b MX, at least stage IIIC.    Treatment history    01/23/2023: low anterior resection    Current treatment:    Surveillance    HPI     67-year-old who had a screening colonoscopy in January 2023 when he was found to have a obstructing mass at 12 cm, biopsy from which revealed invasive adenocarcinoma, concerning for malignancy.  He underwent a low anterior resection on 01/23/2023, pathology from which revealed invasive adenocarcinoma, pT3 pN2b.  Patient's postop course was complicated with anastomotic leak assess delayed wound healing.  He was referred to our clinic to discuss further treatment recommendations given his diagnosis of stage III rectal cancer.  Patient presented to my clinic on 05/04/2023 to establish care.  Patient declined adjuvant radiation therapy or chemotherapy.      Interval history     Today, 11/14/2023, patient denies any acute concerns.  He is walking independently without a walker.  He denies any falls.  He denies any blood in his stools or dark tarry stools.  He denies any new foci of pain.  He denies any new lumps or bumps. Notable 32 lb weight gain since last appointment. CEA has increased to 5.5 from 2.5    Pathology  01/30/2023 upper rectum/lower sigmoid colon resection:  Invasive adenocarcinoma, moderately differentiated, tumor invading the perirectal soft tissue, margins negative for tumor.  Ten of 38 lymph nodes positive for metastatic disease.  [id number]      02/06/2023 sigmoid colon resection-portion of perforated colon showing focal mucosal ischemia, vascular congestion, acute inflammation hemorrhage and acute serositis.  Surgical margins showing viable colon with acute serositis.  Three benign lymph nodes negative for tumor.  Negative for malignancy.      Imaging    08/08/2023 CT chest abdomen pelvis with contrast:  Decrease in airspace opacity at the medial  aspect of the right middle lobe with residual changes most likely representing segmental atelectasis or scarring.  Resolution of bilateral pleural effusions.  Slight decrease in size of nonspecific subcarinal nodes, largest measuring 1 cm, no other significant change, resolution of air-fluid level in the left side of the abdomen with removal of surgical drain.  Stable retroperitoneal adenopathy.  Left lower quadrant periosteal hernia containing small bowel but not resulting obstruction.  Slight decrease in abdominal ascites.  No other significant change.    05/10/2023 CT chest abdomen pelvis with contrast:  Persistent air and fluid collection in the left anterior lower quadrant intermittently with posterior margin of the adjacent distal colon as well as region of the surgical suture in the distal colonic segment.  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  Overall there is more air unless fluid in this region in comparison to prior exam.  Development of borderline bilateral inguinal and mild retroperitoneal adenopathy.  Nonspecific wall thickening involving the distal colonic segment leading to left sided ostomy.  Possibility of colitis is considered.  This may be reactive secondary to adjacent inflammatory process.  No other significant change.    02/08/23: Recent  intestinal surgery with placement of left sided colostomy and a surgical drain.  Free intraperitoneal air has resolved although scattered free fluid remains.  There is no bowel obstruction or significant bowel dilatation.  No abscess of other organized fluid collection.  New small pleural effusions with greater atelectasis in each lung base.  Superimposed lower lobe infiltrate is possible bilaterally.  Worsening body wall edema.      Laboratory    11/14/23:  CEA 5.5, Creatinine 1.08, albumin 4.0, calcium 9.4, LFTs within normal limits, WBC count 8.39,  hemoglobin 12, MCV 93.9, platelet count 224.    2023:  CEA 2.5, Creatinine 0.98, albumin 3.7, calcium 9.3, LFTs within normal limits, WBC count 8.13, hemoglobin 11.2, MCV 92.7, platelet count 225.    2023:  Creatinine 0.83, albumin 2.5, calcium 8.8, alkaline phosphatase 55, total bili 0.9, AST 8, ALT less than 6, CEA 4.0, WBC count 14.6, hemoglobin 8.3, MCV 94.2, platelet count 3 1 8, ANC 8.96.    Past Medical History:   Diagnosis Date    Colon cancer     Hypertension     Mixed hyperlipidemia        History reviewed. No pertinent surgical history.    History reviewed. No pertinent family history.    Social History     Socioeconomic History    Marital status:    Tobacco Use    Smoking status: Never    Smokeless tobacco: Never   Substance and Sexual Activity    Alcohol use: Not Currently    Drug use: Never       Current Outpatient Medications   Medication Sig Dispense Refill    albuterol (ACCUNEB) 1.25 mg/3 mL Nebu Take by nebulization every 8 (eight) hours.      albuterol (PROVENTIL/VENTOLIN HFA) 90 mcg/actuation inhaler 1 puff every 4 (four) hours as needed.      ALPRAZolam (XANAX) 0.5 MG tablet SMARTSI.5 Tablet(s) By Mouth      amiodarone (PACERONE) 200 MG Tab Take 200 mg by mouth.      ELIQUIS 5 mg Tab Take 5 mg by mouth 2 (two) times daily.      FEROSUL 325 mg (65 mg iron) Tab tablet Take by mouth.      ferrous sulfate 325 (65 FE) MG EC tablet 1 tablet Orally Once a day for 90 days      fluticasone propionate (FLONASE) 50 mcg/actuation nasal spray SMARTSI Spray(s) Both Nares Every Morning      folic acid (FOLVITE) 1 MG tablet Take 1,000 mcg by mouth.      furosemide (LASIX) 40 MG tablet SMARTSI Tablet(s) By Mouth Twice a Week PRN      lisinopriL 10 MG tablet Take 10 mg by mouth.      pravastatin (PRAVACHOL) 40 MG tablet Take 40 mg by mouth.      SANTYL ointment SMARTSIG:sparingly Topical Daily      STIOLTO RESPIMAT 2.5-2.5 mcg/actuation Mist SMARTSI Puff(s) Via Inhaler Daily       topiramate (TOPAMAX) 100 MG tablet 1 tablet Orally BID for 30 day(s)       No current facility-administered medications for this visit.       Review of patient's allergies indicates:  No Known Allergies      Review of systems     CONSTITUTIONAL: no fevers, no chills, no weight loss, no fatigue, no weakness  HEMATOLOGIC: no abnormal bleeding, no abnormal bruising, no drenching night sweats  ONCOLOGIC: no new masses or lumps  HEENT: no vision loss, no tinnitus or hearing loss, no nose bleeding, no dysphagia, no odynophagia  CVS: no chest pain, no palpitations, no dyspnea on exertion  RESP: no shortness of breath, no hemoptysis, no cough  GI: no nausea, no vomiting, no diarrhea, no constipation, no melena, no hematochezia, no hematemesis, no abdominal pain, positive for increase in abdominal girth  : no dysuria, no hematuria, no hesitancy, no scrotal swelling, no discharge  INTEGUMENT: no rashes, no abnormal bruising, no nail pitting, no hyperpigmentation  NEURO: no falls, no memory loss, no paresthesias or dysesthesias, no urofecal incontinence or retention, no loss of strength on any extremity  MSK: no back pain, no new joint pain, no joint swelling  PSYCH: no suicidal or homicidal ideation, no depression, no insomnia, no anhedonia  ENDOCRINE: no heat or cold intolerance, no polyuria, no polydipsia      Physical exam     Vitals:    11/15/23 1005   BP: (!) 147/82   Pulse: 74   Resp: 20   Temp: 97.7 °F (36.5 °C)         ECOG PS 0  GA: AAOx3, NAD  HEENT: NCAT, moist oral mucous membranes  LYMPH: no cervical, axillary or supraclavicular adenopathy  CVS: s1s2 RRR, no M/R/G  RESP: CTA b/l, no crackles, no wheezes or rhonchi  ABD: firm and slightly distended, NT, BS+, no hepatosplenomegaly, ostomy in place, healthy stoma.   EXT: no deformities, no pedal edema  SKIN: no rashes, warm and dry  NEURO: normal mentation, strength 5/5 on all 4 extremities, no sensory deficits    Assessment and plan     # Rectal adenocarcinoma,  pT3 pN2b MX, at least stage IIIC.  Noted operative report from January 2023 with findings of a palpable mass at peritoneal reflection.  Patient had surgical complications with concerns for perforation since anastomotic leak in February 2023 during another surgery status post sigmoid colon resection.  Status post low anterior resection, anastomotic disruption diverting colostomy, with delayed wound healing.  Discussed with patient and his daughter the diagnosis of rectal adenocarcinoma, treatment recommendations including chemotherapy, chemoradiation therapy and surgical resection.  Patient has had complications from his surgical resection , now with delayed wound healing.  He was 13 weeks postop during his initial consultation with me.    I do not believe he would benefit from any adjuvant chemotherapy or chemoradiation therapy at this point especially since he is still healing.    Status post radiation oncology consultation recommendations for concurrent chemo RT after treatment for abdominal wall abscess.  He is status post IR for drain placement.  Reviewed CT chest abdomen pelvis IV contrast on in May 2023 without obvious evidence of disease recurrence  Patient was decided not to do chemotherapy concurrently with radiation therapy.    Will plan for active surveillance, with labs every 3-6 months for the 1st 2 years and every 6 months thereafter to complete a total of 5 years along with a CT chest abdomen pelvis with contrast every 6 months for a total of 5 years.        Plan   Plan to follow-up in 3 months after scans  with CBC, CMP, CEA .  Schedule CT CAP with contrast for 02/2024 today  Follow-up with Dr. Head and Dr. Morse as scheduled.    Patient needs colonoscopy 2/2024 this is schedued by GI    A total of  30 minutes were spent in review of records, interpretation of test, coordination of care, discussion and counseling with the patient.

## 2023-11-15 ENCOUNTER — OFFICE VISIT (OUTPATIENT)
Dept: HEMATOLOGY/ONCOLOGY | Facility: CLINIC | Age: 68
End: 2023-11-15
Payer: MEDICARE

## 2023-11-15 VITALS
OXYGEN SATURATION: 99 % | RESPIRATION RATE: 20 BRPM | TEMPERATURE: 98 F | HEART RATE: 74 BPM | HEIGHT: 66 IN | WEIGHT: 245.19 LBS | SYSTOLIC BLOOD PRESSURE: 147 MMHG | DIASTOLIC BLOOD PRESSURE: 82 MMHG | BODY MASS INDEX: 39.41 KG/M2

## 2023-11-15 DIAGNOSIS — C18.9 MALIGNANT NEOPLASM OF COLON, UNSPECIFIED PART OF COLON: ICD-10-CM

## 2023-11-15 DIAGNOSIS — C20 RECTAL CANCER: Primary | ICD-10-CM

## 2023-11-15 PROCEDURE — 4010F PR ACE/ARB THEARPY RXD/TAKEN: ICD-10-PCS | Mod: CPTII,,,

## 2023-11-15 PROCEDURE — 3288F PR FALLS RISK ASSESSMENT DOCUMENTED: ICD-10-PCS | Mod: CPTII,,,

## 2023-11-15 PROCEDURE — 1101F PT FALLS ASSESS-DOCD LE1/YR: CPT | Mod: CPTII,,,

## 2023-11-15 PROCEDURE — 1159F PR MEDICATION LIST DOCUMENTED IN MEDICAL RECORD: ICD-10-PCS | Mod: CPTII,,,

## 2023-11-15 PROCEDURE — 1160F RVW MEDS BY RX/DR IN RCRD: CPT | Mod: CPTII,,,

## 2023-11-15 PROCEDURE — 3008F PR BODY MASS INDEX (BMI) DOCUMENTED: ICD-10-PCS | Mod: CPTII,,,

## 2023-11-15 PROCEDURE — 99214 OFFICE O/P EST MOD 30 MIN: CPT | Mod: ,,,

## 2023-11-15 PROCEDURE — 3008F BODY MASS INDEX DOCD: CPT | Mod: CPTII,,,

## 2023-11-15 PROCEDURE — 4010F ACE/ARB THERAPY RXD/TAKEN: CPT | Mod: CPTII,,,

## 2023-11-15 PROCEDURE — 1159F MED LIST DOCD IN RCRD: CPT | Mod: CPTII,,,

## 2023-11-15 PROCEDURE — 1160F PR REVIEW ALL MEDS BY PRESCRIBER/CLIN PHARMACIST DOCUMENTED: ICD-10-PCS | Mod: CPTII,,,

## 2023-11-15 PROCEDURE — 3079F PR MOST RECENT DIASTOLIC BLOOD PRESSURE 80-89 MM HG: ICD-10-PCS | Mod: CPTII,,,

## 2023-11-15 PROCEDURE — 3077F PR MOST RECENT SYSTOLIC BLOOD PRESSURE >= 140 MM HG: ICD-10-PCS | Mod: CPTII,,,

## 2023-11-15 PROCEDURE — 3079F DIAST BP 80-89 MM HG: CPT | Mod: CPTII,,,

## 2023-11-15 PROCEDURE — 3077F SYST BP >= 140 MM HG: CPT | Mod: CPTII,,,

## 2023-11-15 PROCEDURE — 3288F FALL RISK ASSESSMENT DOCD: CPT | Mod: CPTII,,,

## 2023-11-15 PROCEDURE — 99214 PR OFFICE/OUTPT VISIT, EST, LEVL IV, 30-39 MIN: ICD-10-PCS | Mod: ,,,

## 2023-11-15 PROCEDURE — 1101F PR PT FALLS ASSESS DOC 0-1 FALLS W/OUT INJ PAST YR: ICD-10-PCS | Mod: CPTII,,,

## 2024-02-19 ENCOUNTER — TELEPHONE (OUTPATIENT)
Dept: HEMATOLOGY/ONCOLOGY | Facility: CLINIC | Age: 69
End: 2024-02-19
Payer: MEDICARE

## 2024-02-19 NOTE — TELEPHONE ENCOUNTER
Aiyana Cash MA McCall, Natalie D, CELIA; Kayli Shields; Luz Carney; Antonio Rosa; Elena Gonzales LPN  Caller: Unspecified (Today,  9:16 AM)  Patient states he called and left Doris a voice mail and has not heard back from her at this time.  He missed CT apt on 2/15/24 due to thinking it was scheduled on 2/20/24.  Patient notified that apt tomorrow at our office will have to be rescheduled to after CT scan, so as soon as we have that scheduled we can give him apt to follow up.  Labs have been done. I will email Doris to have her R/S CT scan          Previous Messages

## 2024-02-20 NOTE — TELEPHONE ENCOUNTER
Doris Ahmadi, Kayli Flowers  Cc: Luz Carney; Alexa Carney; Aiyana Cash MA  Caller: Unspecified (4 days ago, 10:59 AM)  CT CAP r/s for 02/21/24 @ [facility] w/ 7:30 arrival    Patient confirmed appt

## 2024-02-20 NOTE — TELEPHONE ENCOUNTER
Luz Carney Dana; Kayli Shields  Cc: Alexa Carney; Aiyana Cash MA  Caller: Unspecified (4 days ago, 10:59 AM)  I r/s appt of 2/20/24 office appt to 2/29/24 Thursday,  due to ct r/s per patient.  Thanks  Luz

## 2024-02-29 ENCOUNTER — OFFICE VISIT (OUTPATIENT)
Dept: HEMATOLOGY/ONCOLOGY | Facility: CLINIC | Age: 69
End: 2024-02-29
Payer: MEDICARE

## 2024-02-29 VITALS
OXYGEN SATURATION: 98 % | DIASTOLIC BLOOD PRESSURE: 72 MMHG | RESPIRATION RATE: 20 BRPM | SYSTOLIC BLOOD PRESSURE: 117 MMHG | HEART RATE: 76 BPM | TEMPERATURE: 98 F | BODY MASS INDEX: 41.51 KG/M2 | HEIGHT: 66 IN | WEIGHT: 258.31 LBS

## 2024-02-29 DIAGNOSIS — C20 RECTAL CANCER: Primary | ICD-10-CM

## 2024-02-29 DIAGNOSIS — R91.1 SOLID NODULE OF LUNG 6 MM TO 8 MM IN DIAMETER: ICD-10-CM

## 2024-02-29 PROCEDURE — 99215 OFFICE O/P EST HI 40 MIN: CPT | Mod: ,,,

## 2024-02-29 NOTE — PROGRESS NOTES
Referring provider : Dr. Morse   Reason for consultation : Colon cancer     Diagnosis:  Rectal adenocarcinoma, pT3 pN2b MX, at least stage IIIC.    Treatment history    01/23/2023: low anterior resection    Current treatment:    Surveillance    HPI     67-year-old who had a screening colonoscopy in January 2023 when he was found to have a obstructing mass at 12 cm, biopsy from which revealed invasive adenocarcinoma, concerning for malignancy.  He underwent a low anterior resection on 01/23/2023, pathology from which revealed invasive adenocarcinoma, pT3 pN2b.  Patient's postop course was complicated with anastomotic leak assess delayed wound healing.  He was referred to our clinic to discuss further treatment recommendations given his diagnosis of stage III rectal cancer.  Patient presented to my clinic on 05/04/2023 to establish care.  Patient declined adjuvant radiation therapy or chemotherapy.      Interval history     Today, 02/29/24, patient denies any acute concerns.   He denies any falls.  He denies any blood in his stools or dark tarry stools.  He denies any new foci of pain.  He denies any new lumps or bumps. He denies sob, chest pain, or hemoptysis.  CEA has increased to 6.2 from 5.5. He has not had his 1 year follow-up colonoscopy with Dr. Head. He did follow-up with Dr. Morse in 12/2023.      Pathology  01/30/2023 upper rectum/lower sigmoid colon resection:  Invasive adenocarcinoma, moderately differentiated, tumor invading the perirectal soft tissue, margins negative for tumor.  Ten of 38 lymph nodes positive for metastatic disease.  [id number]      02/06/2023 sigmoid colon resection-portion of perforated colon showing focal mucosal ischemia, vascular congestion, acute inflammation hemorrhage and acute serositis.  Surgical margins showing viable colon with acute serositis.  Three benign lymph nodes negative for tumor.  Negative for malignancy.      Imaging    02/21/24 CT CAP w/ contrast: A new 6 mm  nodule in the right lower lobe not seen on previous imaging may be concerning for metastatic disease. Tiny right thyroid nodule stable, atherosclerotic and degenerative changes. In the abdomen there is no evidence of neoplastic disease. Slight improvement of retroperitoneal adenopathy since 8/8/23 08/08/2023 CT chest abdomen pelvis with contrast:  Decrease in airspace opacity at the medial aspect of the right middle lobe with residual changes most likely representing segmental atelectasis or scarring.  Resolution of bilateral pleural effusions.  Slight decrease in size of nonspecific subcarinal nodes, largest measuring 1 cm, no other significant change, resolution of air-fluid level in the left side of the abdomen with removal of surgical drain.  Stable retroperitoneal adenopathy.  Left lower quadrant periosteal hernia containing small bowel but not resulting obstruction.  Slight decrease in abdominal ascites.  No other significant change.    05/10/2023 CT chest abdomen pelvis with contrast:  Persistent air and fluid collection in the left anterior lower quadrant intermittently with posterior margin of the adjacent distal colon as well as region of the surgical suture in the distal colonic segment.  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  Overall there is more air unless fluid in this region in comparison to prior exam.  Development of borderline bilateral inguinal and mild retroperitoneal adenopathy.  Nonspecific wall thickening involving the distal colonic segment leading to left sided ostomy.  Possibility of colitis is considered.  This may be reactive secondary to adjacent inflammatory process.  No other significant change.    02/08/23: Recent  intestinal surgery with placement of left sided colostomy and a surgical drain.  Free intraperitoneal air has resolved although scattered free fluid remains.   There is no bowel obstruction or significant bowel dilatation.  No abscess of other organized fluid collection.  New small pleural effusions with greater atelectasis in each lung base.  Superimposed lower lobe infiltrate is possible bilaterally.  Worsening body wall edema.      Laboratory    24: CEA 6.2, cr 1.29, alb 3.8, ca 9.40, LFTs WNL, wbc 8.23, hgb 12.8, plt 220, ANC 3.60  23:  CEA 5.5, Creatinine 1.08, albumin 4.0, calcium 9.4, LFTs within normal limits, WBC count 8.39, hemoglobin 12, MCV 93.9, platelet count 224.    2023:  CEA 2.5, Creatinine 0.98, albumin 3.7, calcium 9.3, LFTs within normal limits, WBC count 8.13, hemoglobin 11.2, MCV 92.7, platelet count 225.    2023:  Creatinine 0.83, albumin 2.5, calcium 8.8, alkaline phosphatase 55, total bili 0.9, AST 8, ALT less than 6, CEA 4.0, WBC count 14.6, hemoglobin 8.3, MCV 94.2, platelet count 3 1 8, ANC 8.96.    Past Medical History:   Diagnosis Date    Colon cancer     Hypertension     Mixed hyperlipidemia        No past surgical history on file.    No family history on file.    Social History     Socioeconomic History    Marital status:    Tobacco Use    Smoking status: Never    Smokeless tobacco: Never   Substance and Sexual Activity    Alcohol use: Not Currently    Drug use: Never       Current Outpatient Medications   Medication Sig Dispense Refill    albuterol (ACCUNEB) 1.25 mg/3 mL Nebu Take by nebulization every 8 (eight) hours.      albuterol (PROVENTIL/VENTOLIN HFA) 90 mcg/actuation inhaler 1 puff every 4 (four) hours as needed.      ALPRAZolam (XANAX) 0.5 MG tablet SMARTSI.5 Tablet(s) By Mouth      amiodarone (PACERONE) 200 MG Tab Take 200 mg by mouth.      ELIQUIS 5 mg Tab Take 5 mg by mouth 2 (two) times daily.      FEROSUL 325 mg (65 mg iron) Tab tablet Take by mouth.      ferrous sulfate 325 (65 FE) MG EC tablet 1 tablet Orally Once a day for 90 days      fluticasone propionate (FLONASE) 50 mcg/actuation  nasal spray SMARTSI Spray(s) Both Nares Every Morning      folic acid (FOLVITE) 1 MG tablet Take 1,000 mcg by mouth.      furosemide (LASIX) 40 MG tablet SMARTSI Tablet(s) By Mouth Twice a Week PRN      lisinopriL 10 MG tablet Take 10 mg by mouth.      pravastatin (PRAVACHOL) 40 MG tablet Take 40 mg by mouth.      SANTYL ointment SMARTSIG:sparingly Topical Daily      STIOLTO RESPIMAT 2.5-2.5 mcg/actuation Mist SMARTSI Puff(s) Via Inhaler Daily      topiramate (TOPAMAX) 100 MG tablet 1 tablet Orally BID for 30 day(s)       No current facility-administered medications for this visit.       Review of patient's allergies indicates:  No Known Allergies      Review of systems     CONSTITUTIONAL: no fevers, no chills, no weight loss, no fatigue, no weakness  HEMATOLOGIC: no abnormal bleeding, no abnormal bruising, no drenching night sweats  ONCOLOGIC: no new masses or lumps  HEENT: no vision loss, no tinnitus or hearing loss, no nose bleeding, no dysphagia, no odynophagia  CVS: no chest pain, no palpitations, no dyspnea on exertion  RESP: no shortness of breath, no hemoptysis, no cough  GI: no nausea, no vomiting, no diarrhea, no constipation, no melena, no hematochezia, no hematemesis, no abdominal pain, positive for increase in abdominal girth  : no dysuria, no hematuria, no hesitancy, no scrotal swelling, no discharge  INTEGUMENT: no rashes, no abnormal bruising, no nail pitting, no hyperpigmentation  NEURO: no falls, no memory loss, no paresthesias or dysesthesias, no urofecal incontinence or retention, no loss of strength on any extremity  MSK: no back pain, no new joint pain, no joint swelling  PSYCH: no suicidal or homicidal ideation, no depression, no insomnia, no anhedonia  ENDOCRINE: no heat or cold intolerance, no polyuria, no polydipsia      Physical exam     Vitals:    24 0956   BP: 117/72   Pulse: 76   Resp: 20   Temp: 98.1 °F (36.7 °C)           ECOG PS 0  GA: AAOx3, NAD  HEENT: NCAT, moist  oral mucous membranes  LYMPH: no cervical, axillary or supraclavicular adenopathy  CVS: s1s2 RRR, no M/R/G  RESP: CTA b/l, no crackles, no wheezes or rhonchi  ABD: firm and slightly distended, NT, BS+, no hepatosplenomegaly, ostomy in place, healthy stoma.   EXT: no deformities, no pedal edema  SKIN: no rashes, warm and dry  NEURO: normal mentation, strength 5/5 on all 4 extremities, no sensory deficits    Assessment and plan     # Rectal adenocarcinoma, pT3 pN2b MX, at least stage IIIC.  Noted operative report from January 2023 with findings of a palpable mass at peritoneal reflection.  Patient had surgical complications with concerns for perforation since anastomotic leak in February 2023 during another surgery status post sigmoid colon resection.  Status post low anterior resection, anastomotic disruption diverting colostomy, with delayed wound healing.  Discussed with patient and his daughter the diagnosis of rectal adenocarcinoma, treatment recommendations including chemotherapy, chemoradiation therapy and surgical resection.  Patient has had complications from his surgical resection , now with delayed wound healing.  He was 13 weeks postop during his initial consultation with me.    I do not believe he would benefit from any adjuvant chemotherapy or chemoradiation therapy at this point especially since he is still healing.    Status post radiation oncology consultation recommendations for concurrent chemo RT after treatment for abdominal wall abscess.  He is status post IR for drain placement.  Reviewed CT chest abdomen pelvis IV contrast on in May 2023 without obvious evidence of disease recurrence  Patient was decided not to do chemotherapy concurrently with radiation therapy.    Will plan for active surveillance, with labs every 3-6 months for the 1st 2 years and every 6 months thereafter to complete a total of 5 years along with a CT chest abdomen pelvis with contrast every 6 months for a total of 5  years.  02/21/24 CT CAP SILVINO in the abd/pelivs area. However, a new 6 mm nodule is noted in the right lower lobe concerning for metastatic disease.       Plan   Reviewed recent CT scan and noted new 6 mm nodule in RLL  Will plan to follow-up with PET-CT given abnormal lung imaging   Will plan to repeat CBC, CMP, CEA   Continue follow-up with Dr. Head and Dr. Morse as scheduled.    Will contact Dr. Head office for follow-up colonoscopy due now.    A total of  40 minutes were spent in review of records, interpretation of test, coordination of care, discussion and counseling with the patient.

## 2024-03-01 ENCOUNTER — TELEPHONE (OUTPATIENT)
Dept: HEMATOLOGY/ONCOLOGY | Facility: CLINIC | Age: 69
End: 2024-03-01
Payer: MEDICARE

## 2024-03-01 NOTE — TELEPHONE ENCOUNTER
----- Message from Ashanti Almeida NP sent at 2/29/2024 10:08 AM CST -----  Please contact Dr. Head office about last follow-up for colonoscopy. He was to repeat one this month and did not. He repeats he does not answer his phone so they need to leave a message. thank

## 2024-03-01 NOTE — TELEPHONE ENCOUNTER
Spoke to Maribel at Dr. Head's office, apt is 3/13/24 and they will discuss and schedule colonoscopy at that visit

## 2024-03-28 DIAGNOSIS — R60.0 LOCALIZED EDEMA: Primary | ICD-10-CM

## 2024-04-01 ENCOUNTER — OFFICE VISIT (OUTPATIENT)
Dept: HEMATOLOGY/ONCOLOGY | Facility: CLINIC | Age: 69
End: 2024-04-01
Payer: MEDICARE

## 2024-04-01 VITALS
BODY MASS INDEX: 41.11 KG/M2 | HEIGHT: 66 IN | DIASTOLIC BLOOD PRESSURE: 49 MMHG | WEIGHT: 255.81 LBS | SYSTOLIC BLOOD PRESSURE: 89 MMHG | RESPIRATION RATE: 18 BRPM | HEART RATE: 93 BPM | OXYGEN SATURATION: 95 % | TEMPERATURE: 98 F

## 2024-04-01 DIAGNOSIS — C20 RECTAL CANCER: Primary | ICD-10-CM

## 2024-04-01 PROCEDURE — 99215 OFFICE O/P EST HI 40 MIN: CPT | Mod: ,,, | Performed by: STUDENT IN AN ORGANIZED HEALTH CARE EDUCATION/TRAINING PROGRAM

## 2024-04-01 RX ORDER — POLYETHYLENE GLYCOL-3350 AND ELECTROLYTES 236; 6.74; 5.86; 2.97; 22.74 G/274.31G; G/274.31G; G/274.31G; G/274.31G; G/274.31G
POWDER, FOR SOLUTION ORAL
COMMUNITY
Start: 2024-03-13

## 2024-04-01 NOTE — PROGRESS NOTES
Referring provider : Dr. Morse   Reason for consultation : Colon cancer     Diagnosis:  Rectal adenocarcinoma, pT3 pN2b MX, at least stage IIIC.    Treatment history    01/23/2023: low anterior resection    Current treatment:    Surveillance    HPI     67-year-old who had a screening colonoscopy in January 2023 when he was found to have a obstructing mass at 12 cm, biopsy from which revealed invasive adenocarcinoma, concerning for malignancy.  He underwent a low anterior resection on 01/23/2023, pathology from which revealed invasive adenocarcinoma, pT3 pN2b.  Patient's postop course was complicated with anastomotic leak assess delayed wound healing.  He was referred to our clinic to discuss further treatment recommendations given his diagnosis of stage III rectal cancer.  Patient presented to my clinic on 05/04/2023 to establish care.  Patient declined adjuvant radiation therapy or chemotherapy.      Interval history     Today, 02/29/24, patient denies any acute concerns.   He denies any falls.  He denies any blood in his stools or dark tarry stools.  He denies any new foci of pain.  He denies any new lumps or bumps. He denies sob, chest pain, or hemoptysis.  CEA has increased to 6.2 from 5.5. He has not had his 1 year follow-up colonoscopy with Dr. Head. He did follow-up with Dr. Morse in 12/2023.      Pathology  01/30/2023 upper rectum/lower sigmoid colon resection:  Invasive adenocarcinoma, moderately differentiated, tumor invading the perirectal soft tissue, margins negative for tumor.  Ten of 38 lymph nodes positive for metastatic disease.  [id number]      02/06/2023 sigmoid colon resection-portion of perforated colon showing focal mucosal ischemia, vascular congestion, acute inflammation hemorrhage and acute serositis.  Surgical margins showing viable colon with acute serositis.  Three benign lymph nodes negative for tumor.  Negative for malignancy.      Imaging      07/20/2024 PET-CT multiple  hypermetabolic lymph nodes above and below the diaphragm that are likely metastatic.  Postsurgical changes of rectosigmoid resection left lower quadrant ostomy without evidence of local disease recurrence.  Segmental dilatation of the left brachiocephalic vein with mild to moderate uptake which is concerning for possible underlying thrombus.  Vascular ultrasound is recommended for further evaluation.    02/21/24 CT CAP w/ contrast: A new 6 mm nodule in the right lower lobe not seen on previous imaging may be concerning for metastatic disease. Tiny right thyroid nodule stable, atherosclerotic and degenerative changes. In the abdomen there is no evidence of neoplastic disease. Slight improvement of retroperitoneal adenopathy since 8/8/23 08/08/2023 CT chest abdomen pelvis with contrast:  Decrease in airspace opacity at the medial aspect of the right middle lobe with residual changes most likely representing segmental atelectasis or scarring.  Resolution of bilateral pleural effusions.  Slight decrease in size of nonspecific subcarinal nodes, largest measuring 1 cm, no other significant change, resolution of air-fluid level in the left side of the abdomen with removal of surgical drain.  Stable retroperitoneal adenopathy.  Left lower quadrant periosteal hernia containing small bowel but not resulting obstruction.  Slight decrease in abdominal ascites.  No other significant change.    05/10/2023 CT chest abdomen pelvis with contrast:  Persistent air and fluid collection in the left anterior lower quadrant intermittently with posterior margin of the adjacent distal colon as well as region of the surgical suture in the distal colonic segment.  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  Overall there is more air unless fluid in this region in comparison to prior exam.  Development of borderline bilateral inguinal and  mild retroperitoneal adenopathy.  Nonspecific wall thickening involving the distal colonic segment leading to left sided ostomy.  Possibility of colitis is considered.  This may be reactive secondary to adjacent inflammatory process.  No other significant change.    02/08/23: Recent  intestinal surgery with placement of left sided colostomy and a surgical drain.  Free intraperitoneal air has resolved although scattered free fluid remains.  There is no bowel obstruction or significant bowel dilatation.  No abscess of other organized fluid collection.  New small pleural effusions with greater atelectasis in each lung base.  Superimposed lower lobe infiltrate is possible bilaterally.  Worsening body wall edema.      Laboratory    03/29/2024 CEA 9.1, creatinine 1.26, albumin 3.8, calcium 9.4, LFTs unremarkable, WBC count 6.9, hemoglobin 11.9, MCV 94.3, platelet count 208, ANC 3.76.    02/12/24: CEA 6.2, cr 1.29, alb 3.8, ca 9.40, LFTs WNL, wbc 8.23, hgb 12.8, plt 220, ANC 3.60  11/14/23:  CEA 5.5, Creatinine 1.08, albumin 4.0, calcium 9.4, LFTs within normal limits, WBC count 8.39, hemoglobin 12, MCV 93.9, platelet count 224.    08/11/2023:  CEA 2.5, Creatinine 0.98, albumin 3.7, calcium 9.3, LFTs within normal limits, WBC count 8.13, hemoglobin 11.2, MCV 92.7, platelet count 225.    05/05/2023:  Creatinine 0.83, albumin 2.5, calcium 8.8, alkaline phosphatase 55, total bili 0.9, AST 8, ALT less than 6, CEA 4.0, WBC count 14.6, hemoglobin 8.3, MCV 94.2, platelet count 3 1 8, ANC 8.96.    Past Medical History:   Diagnosis Date    Colon cancer     Hypertension     Mixed hyperlipidemia        History reviewed. No pertinent surgical history.    History reviewed. No pertinent family history.    Social History     Socioeconomic History    Marital status:    Tobacco Use    Smoking status: Never    Smokeless tobacco: Never   Substance and Sexual Activity    Alcohol use: Not Currently    Drug use: Never       Current  Outpatient Medications   Medication Sig Dispense Refill    albuterol (ACCUNEB) 1.25 mg/3 mL Nebu Take by nebulization every 8 (eight) hours.      albuterol (PROVENTIL/VENTOLIN HFA) 90 mcg/actuation inhaler 1 puff every 4 (four) hours as needed.      ALPRAZolam (XANAX) 0.5 MG tablet SMARTSI.5 Tablet(s) By Mouth      amiodarone (PACERONE) 200 MG Tab Take 200 mg by mouth.      ELIQUIS 5 mg Tab Take 5 mg by mouth 2 (two) times daily.      FEROSUL 325 mg (65 mg iron) Tab tablet Take by mouth.      ferrous sulfate 325 (65 FE) MG EC tablet 1 tablet Orally Once a day for 90 days      fluticasone propionate (FLONASE) 50 mcg/actuation nasal spray SMARTSI Spray(s) Both Nares Every Morning      folic acid (FOLVITE) 1 MG tablet Take 1,000 mcg by mouth.      furosemide (LASIX) 40 MG tablet SMARTSI Tablet(s) By Mouth Twice a Week PRN      GAVILYTE-G 236-22.74-6.74 -5.86 gram suspension Take by mouth.      lisinopriL 10 MG tablet Take 10 mg by mouth.      pravastatin (PRAVACHOL) 40 MG tablet Take 40 mg by mouth.      SANTYL ointment SMARTSIG:sparingly Topical Daily      STIOLTO RESPIMAT 2.5-2.5 mcg/actuation Mist SMARTSI Puff(s) Via Inhaler Daily      topiramate (TOPAMAX) 100 MG tablet 1 tablet Orally BID for 30 day(s)       No current facility-administered medications for this visit.       Review of patient's allergies indicates:  No Known Allergies      Review of systems     CONSTITUTIONAL: no fevers, no chills, no weight loss, no fatigue, no weakness  HEMATOLOGIC: no abnormal bleeding, no abnormal bruising, no drenching night sweats  ONCOLOGIC: no new masses or lumps  HEENT: no vision loss, no tinnitus or hearing loss, no nose bleeding, no dysphagia, no odynophagia  CVS: no chest pain, no palpitations, no dyspnea on exertion  RESP: no shortness of breath, no hemoptysis, no cough  GI: no nausea, no vomiting, no diarrhea, no constipation, no melena, no hematochezia, no hematemesis, no abdominal pain, positive for  increase in abdominal girth  : no dysuria, no hematuria, no hesitancy, no scrotal swelling, no discharge  INTEGUMENT: no rashes, no abnormal bruising, no nail pitting, no hyperpigmentation  NEURO: no falls, no memory loss, no paresthesias or dysesthesias, no urofecal incontinence or retention, no loss of strength on any extremity  MSK: no back pain, no new joint pain, no joint swelling  PSYCH: no suicidal or homicidal ideation, no depression, no insomnia, no anhedonia  ENDOCRINE: no heat or cold intolerance, no polyuria, no polydipsia      Physical exam     Vitals:    04/01/24 1409   BP: (!) 89/49   Pulse: 93   Resp: 18   Temp: 98.1 °F (36.7 °C)           ECOG PS 0  GA: AAOx3, NAD  HEENT: NCAT, moist oral mucous membranes  LYMPH: no cervical, axillary or supraclavicular adenopathy, left supraclavicular fullness  CVS: s1s2 RRR, no M/R/G  RESP: CTA b/l, no crackles, no wheezes or rhonchi  ABD: firm and slightly distended, NT, BS+, no hepatosplenomegaly, ostomy in place, healthy stoma.   EXT: no deformities, no pedal edema  SKIN: no rashes, warm and dry  NEURO: normal mentation, strength 5/5 on all 4 extremities, no sensory deficits    Assessment and plan     # Rectal adenocarcinoma, pT3 pN2b MX, at least stage IIIC.  Noted operative report from January 2023 with findings of a palpable mass at peritoneal reflection.  Patient had surgical complications with concerns for perforation since anastomotic leak in February 2023 during another surgery status post sigmoid colon resection.  Status post low anterior resection, anastomotic disruption diverting colostomy, with delayed wound healing.  Discussed with patient and his daughter the diagnosis of rectal adenocarcinoma, treatment recommendations including chemotherapy, chemoradiation therapy and surgical resection.  Patient has had complications from his surgical resection , now with delayed wound healing.  He was 13 weeks postop during his initial consultation with me.     I do not believe he would benefit from any adjuvant chemotherapy or chemoradiation therapy at this point especially since he is still healing.    Status post radiation oncology consultation recommendations for concurrent chemo RT after treatment for abdominal wall abscess.  He is status post IR for drain placement.  Reviewed CT chest abdomen pelvis IV contrast on in May 2023 without obvious evidence of disease recurrence  Patient was decided not to do chemotherapy concurrently with radiation therapy.    Will plan for active surveillance, with labs every 3-6 months for the 1st 2 years and every 6 months thereafter to complete a total of 5 years along with a CT chest abdomen pelvis with contrast every 6 months for a total of 5 years.  02/21/24 CT CAP SILVINO in the abd/pelivs area. However, a new 6 mm nodule is noted in the right lower lobe concerning for metastatic disease.   PET-CT in March 2024 with adenopathy above and below the diaphragm concerning for disease recurrence.    Plan   Reviewed PET-CT with findings of adenopathy above and below diaphragm concerning for disease recurrence.  Discussed with patient the need for biopsy to establish diagnosis   Will refer to surgery for possible biopsy of the left supraclavicular lymph node  Ultrasound left upper extremity given concerns for it thrombus in the brachiocephalic  Patient was advised to repeat his blood pressure measurements at home and contact his PCP if he is persistently hypotensive.  He is on antihypertensives under the direction of his PCP.  Plan to follow-up in clinic after biopsy to discuss pathology report and further treatment recommendations including possibly initiating palliative systemic therapy.        A total of  40 minutes were spent in review of records, interpretation of test, coordination of care, discussion and counseling with the patient.        Portions of the record may have been created with voice recognition software. Occasional  wrong-word or sound-a-like substitutions may have occurred due to the inherent limitations of voice recognition software.

## 2024-04-23 ENCOUNTER — OFFICE VISIT (OUTPATIENT)
Dept: HEMATOLOGY/ONCOLOGY | Facility: CLINIC | Age: 69
End: 2024-04-23
Payer: MEDICARE

## 2024-04-23 VITALS
WEIGHT: 260.88 LBS | HEART RATE: 73 BPM | DIASTOLIC BLOOD PRESSURE: 68 MMHG | HEIGHT: 66 IN | RESPIRATION RATE: 18 BRPM | TEMPERATURE: 98 F | OXYGEN SATURATION: 97 % | SYSTOLIC BLOOD PRESSURE: 113 MMHG | BODY MASS INDEX: 41.93 KG/M2

## 2024-04-23 DIAGNOSIS — C77.0 MALIGNANT NEOPLASM METASTATIC TO LYMPH NODE OF NECK: ICD-10-CM

## 2024-04-23 DIAGNOSIS — C77.2 METASTASIS TO RETROPERITONEAL LYMPH NODE: ICD-10-CM

## 2024-04-23 DIAGNOSIS — C20 RECTAL CANCER: Primary | ICD-10-CM

## 2024-04-23 PROCEDURE — 1126F AMNT PAIN NOTED NONE PRSNT: CPT | Mod: CPTII,,, | Performed by: INTERNAL MEDICINE

## 2024-04-23 PROCEDURE — 1101F PT FALLS ASSESS-DOCD LE1/YR: CPT | Mod: CPTII,,, | Performed by: INTERNAL MEDICINE

## 2024-04-23 PROCEDURE — 3008F BODY MASS INDEX DOCD: CPT | Mod: CPTII,,, | Performed by: INTERNAL MEDICINE

## 2024-04-23 PROCEDURE — 3288F FALL RISK ASSESSMENT DOCD: CPT | Mod: CPTII,,, | Performed by: INTERNAL MEDICINE

## 2024-04-23 PROCEDURE — 1159F MED LIST DOCD IN RCRD: CPT | Mod: CPTII,,, | Performed by: INTERNAL MEDICINE

## 2024-04-23 PROCEDURE — 3078F DIAST BP <80 MM HG: CPT | Mod: CPTII,,, | Performed by: INTERNAL MEDICINE

## 2024-04-23 PROCEDURE — 3074F SYST BP LT 130 MM HG: CPT | Mod: CPTII,,, | Performed by: INTERNAL MEDICINE

## 2024-04-23 PROCEDURE — 4010F ACE/ARB THERAPY RXD/TAKEN: CPT | Mod: CPTII,,, | Performed by: INTERNAL MEDICINE

## 2024-04-23 PROCEDURE — 99214 OFFICE O/P EST MOD 30 MIN: CPT | Mod: ,,, | Performed by: INTERNAL MEDICINE

## 2024-04-23 NOTE — PROGRESS NOTES
Referring provider : Dr. Morse   Reason for consultation : Colon cancer     Diagnosis:  Rectal adenocarcinoma, pT3 pN2b MX, at least stage IIIC.    Treatment history    01/23/2023: low anterior resection    Current treatment:    Palliative systemic therapy pending NGS testing.    HPI     68-year-old who had a screening colonoscopy in January 2023 when he was found to have a obstructing mass at 12 cm, biopsy from which revealed invasive adenocarcinoma, concerning for malignancy.  He underwent a low anterior resection on 01/23/2023, pathology from which revealed invasive adenocarcinoma, pT3 pN2b.  Patient's postop course was complicated with anastomotic leak assess delayed wound healing.  He was referred to our clinic to discuss further treatment recommendations given his diagnosis of stage III rectal cancer.  Patient presented to my clinic on 05/04/2023 to establish care.  Patient declined adjuvant radiation therapy or chemotherapy.  Unfortuneatly, he experience biopsy proven recurrence in LN above and below diaphragm in 4/2024    Interval history     Today, 4/23/24:  patient had FNA of cervical LN which confirms recurrence of his rectal cancer. Patient is asymptomatic except for enlarged cervical LN. Will send off NGS testing to determine best course of treatment.   02/29/24, patient denies any acute concerns.   He denies any falls.  He denies any blood in his stools or dark tarry stools.  He denies any new foci of pain.  He denies any new lumps or bumps. He denies sob, chest pain, or hemoptysis.  CEA has increased to 6.2 from 5.5. He has not had his 1 year follow-up colonoscopy with Dr. Head. He did follow-up with Dr. Morse in 12/2023.      Pathology  01/30/2023 upper rectum/lower sigmoid colon resection:  Invasive adenocarcinoma, moderately differentiated, tumor invading the perirectal soft tissue, margins negative for tumor.  Ten of 38 lymph nodes positive for metastatic disease.  [id number]      02/06/2023  sigmoid colon resection-portion of perforated colon showing focal mucosal ischemia, vascular congestion, acute inflammation hemorrhage and acute serositis.  Surgical margins showing viable colon with acute serositis.  Three benign lymph nodes negative for tumor.  Negative for malignancy.      Imaging      07/20/2024 PET-CT multiple hypermetabolic lymph nodes above and below the diaphragm that are likely metastatic.  Postsurgical changes of rectosigmoid resection left lower quadrant ostomy without evidence of local disease recurrence.  Segmental dilatation of the left brachiocephalic vein with mild to moderate uptake which is concerning for possible underlying thrombus.  Vascular ultrasound is recommended for further evaluation.    02/21/24 CT CAP w/ contrast: A new 6 mm nodule in the right lower lobe not seen on previous imaging may be concerning for metastatic disease. Tiny right thyroid nodule stable, atherosclerotic and degenerative changes. In the abdomen there is no evidence of neoplastic disease. Slight improvement of retroperitoneal adenopathy since 8/8/23 08/08/2023 CT chest abdomen pelvis with contrast:  Decrease in airspace opacity at the medial aspect of the right middle lobe with residual changes most likely representing segmental atelectasis or scarring.  Resolution of bilateral pleural effusions.  Slight decrease in size of nonspecific subcarinal nodes, largest measuring 1 cm, no other significant change, resolution of air-fluid level in the left side of the abdomen with removal of surgical drain.  Stable retroperitoneal adenopathy.  Left lower quadrant periosteal hernia containing small bowel but not resulting obstruction.  Slight decrease in abdominal ascites.  No other significant change.    05/10/2023 CT chest abdomen pelvis with contrast:  Persistent air and fluid collection in the left anterior lower quadrant intermittently with posterior margin of the adjacent distal colon as well as region  of the surgical suture in the distal colonic segment.  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  Overall there is more air unless fluid in this region in comparison to prior exam.  Development of borderline bilateral inguinal and mild retroperitoneal adenopathy.  Nonspecific wall thickening involving the distal colonic segment leading to left sided ostomy.  Possibility of colitis is considered.  This may be reactive secondary to adjacent inflammatory process.  No other significant change.    02/08/23: Recent  intestinal surgery with placement of left sided colostomy and a surgical drain.  Free intraperitoneal air has resolved although scattered free fluid remains.  There is no bowel obstruction or significant bowel dilatation.  No abscess of other organized fluid collection.  New small pleural effusions with greater atelectasis in each lung base.  Superimposed lower lobe infiltrate is possible bilaterally.  Worsening body wall edema.      Laboratory    03/29/2024 CEA 9.1, creatinine 1.26, albumin 3.8, calcium 9.4, LFTs unremarkable, WBC count 6.9, hemoglobin 11.9, MCV 94.3, platelet count 208, ANC 3.76.    02/12/24: CEA 6.2, cr 1.29, alb 3.8, ca 9.40, LFTs WNL, wbc 8.23, hgb 12.8, plt 220, ANC 3.60  11/14/23:  CEA 5.5, Creatinine 1.08, albumin 4.0, calcium 9.4, LFTs within normal limits, WBC count 8.39, hemoglobin 12, MCV 93.9, platelet count 224.    08/11/2023:  CEA 2.5, Creatinine 0.98, albumin 3.7, calcium 9.3, LFTs within normal limits, WBC count 8.13, hemoglobin 11.2, MCV 92.7, platelet count 225.    05/05/2023:  Creatinine 0.83, albumin 2.5, calcium 8.8, alkaline phosphatase 55, total bili 0.9, AST 8, ALT less than 6, CEA 4.0, WBC count 14.6, hemoglobin 8.3, MCV 94.2, platelet count 3 1 8, ANC 8.96.    Past Medical History:   Diagnosis Date    Colon cancer     Hypertension     Mixed hyperlipidemia        No  past surgical history on file.    No family history on file.    Social History     Socioeconomic History    Marital status:    Tobacco Use    Smoking status: Never    Smokeless tobacco: Never   Substance and Sexual Activity    Alcohol use: Not Currently    Drug use: Never       Current Outpatient Medications   Medication Sig Dispense Refill    albuterol (ACCUNEB) 1.25 mg/3 mL Nebu Take by nebulization every 8 (eight) hours.      albuterol (PROVENTIL/VENTOLIN HFA) 90 mcg/actuation inhaler 1 puff every 4 (four) hours as needed.      ALPRAZolam (XANAX) 0.5 MG tablet SMARTSI.5 Tablet(s) By Mouth      amiodarone (PACERONE) 200 MG Tab Take 200 mg by mouth.      ELIQUIS 5 mg Tab Take 5 mg by mouth 2 (two) times daily.      FEROSUL 325 mg (65 mg iron) Tab tablet Take by mouth.      ferrous sulfate 325 (65 FE) MG EC tablet 1 tablet Orally Once a day for 90 days      fluticasone propionate (FLONASE) 50 mcg/actuation nasal spray SMARTSI Spray(s) Both Nares Every Morning      folic acid (FOLVITE) 1 MG tablet Take 1,000 mcg by mouth.      furosemide (LASIX) 40 MG tablet SMARTSI Tablet(s) By Mouth Twice a Week PRN      GAVILYTE-G 236-22.74-6.74 -5.86 gram suspension Take by mouth.      lisinopriL 10 MG tablet Take 10 mg by mouth.      pravastatin (PRAVACHOL) 40 MG tablet Take 40 mg by mouth.      SANTYL ointment SMARTSIG:sparingly Topical Daily      STIOLTO RESPIMAT 2.5-2.5 mcg/actuation Mist SMARTSI Puff(s) Via Inhaler Daily      topiramate (TOPAMAX) 100 MG tablet 1 tablet Orally BID for 30 day(s)       No current facility-administered medications for this visit.       Review of patient's allergies indicates:  No Known Allergies      Review of systems     CONSTITUTIONAL: no fevers, no chills, no weight loss, no fatigue, no weakness  HEMATOLOGIC: no abnormal bleeding, no abnormal bruising, no drenching night sweats  ONCOLOGIC: no new masses or lumps  HEENT: no vision loss, no tinnitus or hearing loss, no nose  bleeding, no dysphagia, no odynophagia  CVS: no chest pain, no palpitations, no dyspnea on exertion  RESP: no shortness of breath, no hemoptysis, no cough  GI: no nausea, no vomiting, no diarrhea, no constipation, no melena, no hematochezia, no hematemesis, no abdominal pain, positive for increase in abdominal girth  : no dysuria, no hematuria, no hesitancy, no scrotal swelling, no discharge  INTEGUMENT: no rashes, no abnormal bruising, no nail pitting, no hyperpigmentation  NEURO: no falls, no memory loss, no paresthesias or dysesthesias, no urofecal incontinence or retention, no loss of strength on any extremity  MSK: no back pain, no new joint pain, no joint swelling  PSYCH: no suicidal or homicidal ideation, no depression, no insomnia, no anhedonia  ENDOCRINE: no heat or cold intolerance, no polyuria, no polydipsia      Physical exam     Vitals:    04/23/24 1022   BP: 113/68   Pulse: 73   Resp: 18   Temp: 98.4 °F (36.9 °C)           ECOG PS 0  GA: AAOx3, NAD  HEENT: NCAT, moist oral mucous membranes  LYMPH: no cervical, axillary or supraclavicular adenopathy, left supraclavicular fullness  CVS: s1s2 RRR, no M/R/G  RESP: CTA b/l, no crackles, no wheezes or rhonchi  ABD: firm and slightly distended, NT, BS+, no hepatosplenomegaly, ostomy in place, healthy stoma.   EXT: no deformities, no pedal edema  SKIN: no rashes, warm and dry  NEURO: normal mentation, strength 5/5 on all 4 extremities, no sensory deficits    Assessment and plan     # Rectal adenocarcinoma, pT3 pN2b MX, at least stage IIIC.  Noted operative report from January 2023 with findings of a palpable mass at peritoneal reflection.  Patient had surgical complications with concerns for perforation since anastomotic leak in February 2023 during another surgery status post sigmoid colon resection.  Status post low anterior resection, anastomotic disruption diverting colostomy, with delayed wound healing.  Discussed with patient and his daughter the  diagnosis of rectal adenocarcinoma, treatment recommendations including chemotherapy, chemoradiation therapy and surgical resection.  Patient has had complications from his surgical resection , now with delayed wound healing.  He was 13 weeks postop during his initial consultation with me.    I do not believe he would benefit from any adjuvant chemotherapy or chemoradiation therapy at this point especially since he is still healing.    Status post radiation oncology consultation recommendations for concurrent chemo RT after treatment for abdominal wall abscess.  He is status post IR for drain placement.  Reviewed CT chest abdomen pelvis IV contrast on in May 2023 without obvious evidence of disease recurrence  Patient was decided not to do chemotherapy concurrently with radiation therapy.    Will plan for active surveillance, with labs every 3-6 months for the 1st 2 years and every 6 months thereafter to complete a total of 5 years along with a CT chest abdomen pelvis with contrast every 6 months for a total of 5 years.  02/21/24 CT CAP SILVINO in the abd/pelivs area. However, a new 6 mm nodule is noted in the right lower lobe concerning for metastatic disease.   PET-CT in March 2024 with adenopathy above and below the diaphragm concerning for disease recurrence.    Plan   Reviewed PET-CT with findings of adenopathy above and below diaphragm concerning for disease recurrence.   Patient is S/p FNA of cervical LN which confirms recurrence of his rectal cancer  Will send tumor for NGS testing in order to determine best treatment approach; patient is agreeable to treatment at this time despite refusing adjuvant chemotherapy initiatlly.   Ultrasound left upper extremity given concerns for it thrombus in the brachiocephalic        A total of  30 minutes were spent in review of records, interpretation of test, coordination of care, discussion and counseling with the patient.        Portions of the record may have been created  with voice recognition software. Occasional wrong-word or sound-a-like substitutions may have occurred due to the inherent limitations of voice recognition software.

## 2024-05-07 NOTE — PROGRESS NOTES
Referring provider : Dr. Morse   Reason for consultation : Colon cancer     Diagnosis:  Rectal adenocarcinoma, pT3 pN2b MX, initially least stage IIIC.  Diagnosed with stage IV disease in April 2024    Treatment history    01/23/2023: low anterior resection    Current treatment:    Plan to initiate FOLFOX on 05/21/2024    HPI     67-year-old who had a screening colonoscopy in January 2023 when he was found to have a obstructing mass at 12 cm, biopsy from which revealed invasive adenocarcinoma, concerning for malignancy.  He underwent a low anterior resection on 01/23/2023, pathology from which revealed invasive adenocarcinoma, pT3 pN2b.  Patient's postop course was complicated with anastomotic leak assess delayed wound healing.  He was referred to our clinic to discuss further treatment recommendations given his diagnosis of stage III rectal cancer.  Patient presented to my clinic on 05/04/2023 to establish care.  Patient declined adjuvant radiation therapy or chemotherapy.  Patient was found to have multiple hypermetabolic foci PET-CT done in March 2024 concerning for metastatic disease.  Biopsy of the left cervical lymph node positive for malignancy consistent with rectal primary.  Treatment history as above    Interval history     Today, 05/08/2024, patient denies any acute concerns.  He reports tolerating this biopsy well without any significant side effects.  He denies any new symptoms of pain, decreased appetite or weight loss any blood in his stools or dark tarry stools he denies fevers, chills, drenching night sweats.    Pathology    4/19/24: Left Cervical LN biopsy: Positive for malignancy, consistent with mod to poorly diff metastatic adenocarcinoma     01/30/2023 upper rectum/lower sigmoid colon resection:  Invasive adenocarcinoma, moderately differentiated, tumor invading the perirectal soft tissue, margins negative for tumor.  Ten of 38 lymph nodes positive for metastatic disease.   [id number]      02/06/2023 sigmoid colon resection-portion of perforated colon showing focal mucosal ischemia, vascular congestion, acute inflammation hemorrhage and acute serositis.  Surgical margins showing viable colon with acute serositis.  Three benign lymph nodes negative for tumor.  Negative for malignancy.      Imaging      04/01/2024 ultrasound upper extremity left, no evidence of left upper extremity venous thrombosis    03/27/2024 PET-CT multiple hypermetabolic lymph nodes above and below the diaphragm that are likely metastatic.  Postsurgical changes of rectosigmoid resection left lower quadrant ostomy without evidence of local disease recurrence.  Segmental dilatation of the left brachiocephalic vein with mild to moderate uptake which is concerning for possible underlying thrombus.  Vascular ultrasound is recommended for further evaluation.    02/21/24 CT CAP w/ contrast: A new 6 mm nodule in the right lower lobe not seen on previous imaging may be concerning for metastatic disease. Tiny right thyroid nodule stable, atherosclerotic and degenerative changes. In the abdomen there is no evidence of neoplastic disease. Slight improvement of retroperitoneal adenopathy since 8/8/23 08/08/2023 CT chest abdomen pelvis with contrast:  Decrease in airspace opacity at the medial aspect of the right middle lobe with residual changes most likely representing segmental atelectasis or scarring.  Resolution of bilateral pleural effusions.  Slight decrease in size of nonspecific subcarinal nodes, largest measuring 1 cm, no other significant change, resolution of air-fluid level in the left side of the abdomen with removal of surgical drain.  Stable retroperitoneal adenopathy.  Left lower quadrant periosteal hernia containing small bowel but not resulting obstruction.  Slight decrease in abdominal ascites.  No other significant change.    05/10/2023 CT chest abdomen pelvis with contrast:  Persistent air and fluid  collection in the left anterior lower quadrant intermittently with posterior margin of the adjacent distal colon as well as region of the surgical suture in the distal colonic segment.  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  Overall there is more air unless fluid in this region in comparison to prior exam.  Development of borderline bilateral inguinal and mild retroperitoneal adenopathy.  Nonspecific wall thickening involving the distal colonic segment leading to left sided ostomy.  Possibility of colitis is considered.  This may be reactive secondary to adjacent inflammatory process.  No other significant change.    02/08/23: Recent  intestinal surgery with placement of left sided colostomy and a surgical drain.  Free intraperitoneal air has resolved although scattered free fluid remains.  There is no bowel obstruction or significant bowel dilatation.  No abscess of other organized fluid collection.  New small pleural effusions with greater atelectasis in each lung base.  Superimposed lower lobe infiltrate is possible bilaterally.  Worsening body wall edema.      Laboratory    03/29/2024 CEA 9.1, creatinine 1.26, albumin 3.8, calcium 9.4, LFTs unremarkable, WBC count 6.9, hemoglobin 11.9, MCV 94.3, platelet count 208, ANC 3.76.    02/12/24: CEA 6.2, cr 1.29, alb 3.8, ca 9.40, LFTs WNL, wbc 8.23, hgb 12.8, plt 220, ANC 3.60  11/14/23:  CEA 5.5, Creatinine 1.08, albumin 4.0, calcium 9.4, LFTs within normal limits, WBC count 8.39, hemoglobin 12, MCV 93.9, platelet count 224.    08/11/2023:  CEA 2.5, Creatinine 0.98, albumin 3.7, calcium 9.3, LFTs within normal limits, WBC count 8.13, hemoglobin 11.2, MCV 92.7, platelet count 225.    05/05/2023:  Creatinine 0.83, albumin 2.5, calcium 8.8, alkaline phosphatase 55, total bili 0.9, AST 8, ALT less than 6, CEA 4.0, WBC count 14.6, hemoglobin 8.3, MCV 94.2, platelet  count 3 1 8, ANC 8.96.    Past Medical History:   Diagnosis Date    Colon cancer     Hypertension     Mixed hyperlipidemia        History reviewed. No pertinent surgical history.    No family history on file.    Social History     Socioeconomic History    Marital status:    Tobacco Use    Smoking status: Never    Smokeless tobacco: Never   Substance and Sexual Activity    Alcohol use: Not Currently    Drug use: Never       Current Outpatient Medications   Medication Sig Dispense Refill    albuterol (ACCUNEB) 1.25 mg/3 mL Nebu Take by nebulization every 8 (eight) hours.      albuterol (PROVENTIL/VENTOLIN HFA) 90 mcg/actuation inhaler 1 puff every 4 (four) hours as needed.      ALPRAZolam (XANAX) 0.5 MG tablet SMARTSI.5 Tablet(s) By Mouth      amiodarone (PACERONE) 200 MG Tab Take 200 mg by mouth.      ELIQUIS 5 mg Tab Take 5 mg by mouth 2 (two) times daily.      FEROSUL 325 mg (65 mg iron) Tab tablet Take by mouth.      ferrous sulfate 325 (65 FE) MG EC tablet 1 tablet Orally Once a day for 90 days      fluticasone propionate (FLONASE) 50 mcg/actuation nasal spray SMARTSI Spray(s) Both Nares Every Morning      folic acid (FOLVITE) 1 MG tablet Take 1,000 mcg by mouth.      furosemide (LASIX) 40 MG tablet SMARTSI Tablet(s) By Mouth Twice a Week PRN      GAVILYTE-G 236-22.74-6.74 -5.86 gram suspension Take by mouth.      lisinopriL 10 MG tablet Take 10 mg by mouth.      pravastatin (PRAVACHOL) 40 MG tablet Take 40 mg by mouth.      SANTYL ointment SMARTSIG:sparingly Topical Daily      STIOLTO RESPIMAT 2.5-2.5 mcg/actuation Mist SMARTSI Puff(s) Via Inhaler Daily      topiramate (TOPAMAX) 100 MG tablet 1 tablet Orally BID for 30 day(s)       No current facility-administered medications for this visit.       Review of patient's allergies indicates:  No Known Allergies      Review of systems     CONSTITUTIONAL: no fevers, no chills, no weight loss, no fatigue, no weakness  HEMATOLOGIC: no abnormal  bleeding, no abnormal bruising, no drenching night sweats  ONCOLOGIC: no new masses or lumps  HEENT: no vision loss, no tinnitus or hearing loss, no nose bleeding, no dysphagia, no odynophagia  CVS: no chest pain, no palpitations, no dyspnea on exertion  RESP: no shortness of breath, no hemoptysis, no cough  GI: no nausea, no vomiting, no diarrhea, no constipation, no melena, no hematochezia, no hematemesis, no abdominal pain, positive for increase in abdominal girth  : no dysuria, no hematuria, no hesitancy, no scrotal swelling, no discharge  INTEGUMENT: no rashes, no abnormal bruising, no nail pitting, no hyperpigmentation  NEURO: no falls, no memory loss, no paresthesias or dysesthesias, no urofecal incontinence or retention, no loss of strength on any extremity  MSK: no back pain, no new joint pain, no joint swelling  PSYCH: no suicidal or homicidal ideation, no depression, no insomnia, no anhedonia  ENDOCRINE: no heat or cold intolerance, no polyuria, no polydipsia      Physical exam     Vitals:    05/08/24 0935   BP: (!) 93/59   Pulse: 63   Resp: 18   Temp: 98 °F (36.7 °C)           ECOG PS 0  GA: AAOx3, NAD  HEENT: NCAT, moist oral mucous membranes  LYMPH: no cervical, axillary or supraclavicular adenopathy, left supraclavicular fullness  CVS: s1s2 RRR, no M/R/G  RESP: CTA b/l, no crackles, no wheezes or rhonchi  ABD: firm and slightly distended, NT, BS+, no hepatosplenomegaly, ostomy in place, healthy stoma.   EXT: no deformities, no pedal edema  SKIN: no rashes, warm and dry  NEURO: normal mentation, strength 5/5 on all 4 extremities, no sensory deficits    Assessment and plan     # Rectal adenocarcinoma, metastatic  Patient had history of stage III rectal adenocarcinoma and received low anterior resection but did not receive any adjuvant chemotherapy or chemoradiation therapy due to difficulty and delay with wound healing   Patient was undergoing surveillance had a PET-CT in March 2024 with findings of  adenopathy above and below the diaphragm concerning for metastatic disease   He had a biopsy of the cervical lymph node, pathology from which was consistent with poorly differentiated adenocarcinoma consistent with rectal primary   This is a plan to initiate palliative systemic chemotherapy with FOLFOX in first-line  Patient was agreeable to this plan of care    # Rectal adenocarcinoma, pT3 pN2b MX, at least stage IIIC.  Noted operative report from January 2023 with findings of a palpable mass at peritoneal reflection.  Patient had surgical complications with concerns for perforation since anastomotic leak in February 2023 during another surgery status post sigmoid colon resection.  Status post low anterior resection, anastomotic disruption diverting colostomy, with delayed wound healing.  Discussed with patient and his daughter the diagnosis of rectal adenocarcinoma, treatment recommendations including chemotherapy, chemoradiation therapy and surgical resection.  Patient has had complications from his surgical resection , now with delayed wound healing.  He was 13 weeks postop during his initial consultation with me.    I do not believe he would benefit from any adjuvant chemotherapy or chemoradiation therapy at this point especially since he is still healing.    Status post radiation oncology consultation recommendations for concurrent chemo RT after treatment for abdominal wall abscess.  He is status post IR for drain placement.  Reviewed CT chest abdomen pelvis IV contrast on in May 2023 without obvious evidence of disease recurrence  Patient was decided not to do chemotherapy concurrently with radiation therapy.    Plan was for active surveillance, with labs every 3-6 months for the 1st 2 years and every 6 months thereafter to complete a total of 5 years along with a CT chest abdomen pelvis with contrast every 6 months for a total of 5 years.  02/21/24 CT CAP SILVINO in the abd/pelivs area. However, a new 6 mm  nodule is noted in the right lower lobe concerning for metastatic disease.   PET-CT in March 2024 with adenopathy above and below the diaphragm concerning for disease recurrence.  Reviewed pathology report from cervical biopsy continue with poorly differential adenocarcinoma, rectal primary.         Plan   Chemo education   Port placement urgently   Plan to initiate chemotherapy with FOLFOX in first-line on 05/21/2024, labs the day prior   Follow-up in clinic prior to cycle 1     A total of  40 minutes were spent in review of records, interpretation of test, coordination of care, discussion and counseling with the patient.        Portions of the record may have been created with voice recognition software. Occasional wrong-word or sound-a-like substitutions may have occurred due to the inherent limitations of voice recognition software.

## 2024-05-08 ENCOUNTER — OFFICE VISIT (OUTPATIENT)
Dept: HEMATOLOGY/ONCOLOGY | Facility: CLINIC | Age: 69
End: 2024-05-08
Payer: MEDICARE

## 2024-05-08 VITALS
HEIGHT: 66 IN | BODY MASS INDEX: 41.8 KG/M2 | RESPIRATION RATE: 18 BRPM | SYSTOLIC BLOOD PRESSURE: 93 MMHG | HEART RATE: 63 BPM | WEIGHT: 260.13 LBS | OXYGEN SATURATION: 95 % | TEMPERATURE: 98 F | DIASTOLIC BLOOD PRESSURE: 59 MMHG

## 2024-05-08 DIAGNOSIS — C20 RECTAL CANCER: Primary | ICD-10-CM

## 2024-05-08 PROCEDURE — 3078F DIAST BP <80 MM HG: CPT | Mod: CPTII,,, | Performed by: STUDENT IN AN ORGANIZED HEALTH CARE EDUCATION/TRAINING PROGRAM

## 2024-05-08 PROCEDURE — 1101F PT FALLS ASSESS-DOCD LE1/YR: CPT | Mod: CPTII,,, | Performed by: STUDENT IN AN ORGANIZED HEALTH CARE EDUCATION/TRAINING PROGRAM

## 2024-05-08 PROCEDURE — 4010F ACE/ARB THERAPY RXD/TAKEN: CPT | Mod: CPTII,,, | Performed by: STUDENT IN AN ORGANIZED HEALTH CARE EDUCATION/TRAINING PROGRAM

## 2024-05-08 PROCEDURE — 3288F FALL RISK ASSESSMENT DOCD: CPT | Mod: CPTII,,, | Performed by: STUDENT IN AN ORGANIZED HEALTH CARE EDUCATION/TRAINING PROGRAM

## 2024-05-08 PROCEDURE — 3074F SYST BP LT 130 MM HG: CPT | Mod: CPTII,,, | Performed by: STUDENT IN AN ORGANIZED HEALTH CARE EDUCATION/TRAINING PROGRAM

## 2024-05-08 PROCEDURE — 1159F MED LIST DOCD IN RCRD: CPT | Mod: CPTII,,, | Performed by: STUDENT IN AN ORGANIZED HEALTH CARE EDUCATION/TRAINING PROGRAM

## 2024-05-08 PROCEDURE — 1126F AMNT PAIN NOTED NONE PRSNT: CPT | Mod: CPTII,,, | Performed by: STUDENT IN AN ORGANIZED HEALTH CARE EDUCATION/TRAINING PROGRAM

## 2024-05-08 PROCEDURE — 99215 OFFICE O/P EST HI 40 MIN: CPT | Mod: ,,, | Performed by: STUDENT IN AN ORGANIZED HEALTH CARE EDUCATION/TRAINING PROGRAM

## 2024-05-08 PROCEDURE — 3008F BODY MASS INDEX DOCD: CPT | Mod: CPTII,,, | Performed by: STUDENT IN AN ORGANIZED HEALTH CARE EDUCATION/TRAINING PROGRAM

## 2024-05-14 ENCOUNTER — OFFICE VISIT (OUTPATIENT)
Dept: SURGERY | Facility: CLINIC | Age: 69
End: 2024-05-14
Payer: MEDICARE

## 2024-05-14 ENCOUNTER — LAB VISIT (OUTPATIENT)
Dept: LAB | Facility: HOSPITAL | Age: 69
End: 2024-05-14
Attending: SURGERY
Payer: MEDICARE

## 2024-05-14 VITALS
HEIGHT: 66 IN | BODY MASS INDEX: 41.78 KG/M2 | HEART RATE: 68 BPM | WEIGHT: 260 LBS | SYSTOLIC BLOOD PRESSURE: 160 MMHG | DIASTOLIC BLOOD PRESSURE: 84 MMHG

## 2024-05-14 DIAGNOSIS — C20 RECTAL CANCER: Primary | ICD-10-CM

## 2024-05-14 DIAGNOSIS — Z01.818 PREOP EXAMINATION: ICD-10-CM

## 2024-05-14 DIAGNOSIS — I87.8 POOR VENOUS ACCESS: ICD-10-CM

## 2024-05-14 LAB
ALBUMIN SERPL-MCNC: 4.1 G/DL (ref 3.4–4.8)
ALBUMIN/GLOB SERPL: 1.2 RATIO (ref 1.1–2)
ALP SERPL-CCNC: 47 UNIT/L (ref 40–150)
ALT SERPL-CCNC: 12 UNIT/L (ref 0–55)
AST SERPL-CCNC: 16 UNIT/L (ref 5–34)
BASOPHILS # BLD AUTO: 0.08 X10(3)/MCL
BASOPHILS NFR BLD AUTO: 1 %
BILIRUB SERPL-MCNC: 0.5 MG/DL
BUN SERPL-MCNC: 25 MG/DL (ref 8.4–25.7)
CALCIUM SERPL-MCNC: 9.5 MG/DL (ref 8.8–10)
CHLORIDE SERPL-SCNC: 107 MMOL/L (ref 98–107)
CO2 SERPL-SCNC: 26 MMOL/L (ref 23–31)
CREAT SERPL-MCNC: 1.06 MG/DL (ref 0.73–1.18)
EOSINOPHIL # BLD AUTO: 0.17 X10(3)/MCL (ref 0–0.9)
EOSINOPHIL NFR BLD AUTO: 2 %
ERYTHROCYTE [DISTWIDTH] IN BLOOD BY AUTOMATED COUNT: 13.8 % (ref 11.5–17)
GFR SERPLBLD CREATININE-BSD FMLA CKD-EPI: >60 ML/MIN/1.73/M2
GLOBULIN SER-MCNC: 3.4 GM/DL (ref 2.4–3.5)
GLUCOSE SERPL-MCNC: 94 MG/DL (ref 82–115)
HCT VFR BLD AUTO: 38.8 % (ref 42–52)
HGB BLD-MCNC: 12.6 G/DL (ref 14–18)
IMM GRANULOCYTES # BLD AUTO: 0.03 X10(3)/MCL (ref 0–0.04)
IMM GRANULOCYTES NFR BLD AUTO: 0.4 %
LYMPHOCYTES # BLD AUTO: 2.27 X10(3)/MCL (ref 0.6–4.6)
LYMPHOCYTES NFR BLD AUTO: 27 %
MCH RBC QN AUTO: 31.1 PG (ref 27–31)
MCHC RBC AUTO-ENTMCNC: 32.5 G/DL (ref 33–36)
MCV RBC AUTO: 95.8 FL (ref 80–94)
MONOCYTES # BLD AUTO: 0.69 X10(3)/MCL (ref 0.1–1.3)
MONOCYTES NFR BLD AUTO: 8.2 %
NEUTROPHILS # BLD AUTO: 5.16 X10(3)/MCL (ref 2.1–9.2)
NEUTROPHILS NFR BLD AUTO: 61.4 %
NRBC BLD AUTO-RTO: 0 %
PLATELET # BLD AUTO: 220 X10(3)/MCL (ref 130–400)
PMV BLD AUTO: 8.4 FL (ref 7.4–10.4)
POTASSIUM SERPL-SCNC: 4.9 MMOL/L (ref 3.5–5.1)
PROT SERPL-MCNC: 7.5 GM/DL (ref 5.8–7.6)
RBC # BLD AUTO: 4.05 X10(6)/MCL (ref 4.7–6.1)
SODIUM SERPL-SCNC: 140 MMOL/L (ref 136–145)
WBC # SPEC AUTO: 8.4 X10(3)/MCL (ref 4.5–11.5)

## 2024-05-14 PROCEDURE — 93010 ELECTROCARDIOGRAM REPORT: CPT | Mod: ,,, | Performed by: INTERNAL MEDICINE

## 2024-05-14 PROCEDURE — 3079F DIAST BP 80-89 MM HG: CPT | Mod: CPTII,,, | Performed by: SURGERY

## 2024-05-14 PROCEDURE — 3288F FALL RISK ASSESSMENT DOCD: CPT | Mod: CPTII,,, | Performed by: SURGERY

## 2024-05-14 PROCEDURE — 3008F BODY MASS INDEX DOCD: CPT | Mod: CPTII,,, | Performed by: SURGERY

## 2024-05-14 PROCEDURE — 93005 ELECTROCARDIOGRAM TRACING: CPT

## 2024-05-14 PROCEDURE — 3077F SYST BP >= 140 MM HG: CPT | Mod: CPTII,,, | Performed by: SURGERY

## 2024-05-14 PROCEDURE — 1126F AMNT PAIN NOTED NONE PRSNT: CPT | Mod: CPTII,,, | Performed by: SURGERY

## 2024-05-14 PROCEDURE — 1159F MED LIST DOCD IN RCRD: CPT | Mod: CPTII,,, | Performed by: SURGERY

## 2024-05-14 PROCEDURE — 4010F ACE/ARB THERAPY RXD/TAKEN: CPT | Mod: CPTII,,, | Performed by: SURGERY

## 2024-05-14 PROCEDURE — 36415 COLL VENOUS BLD VENIPUNCTURE: CPT

## 2024-05-14 PROCEDURE — 99204 OFFICE O/P NEW MOD 45 MIN: CPT | Mod: ,,, | Performed by: SURGERY

## 2024-05-14 PROCEDURE — 1101F PT FALLS ASSESS-DOCD LE1/YR: CPT | Mod: CPTII,,, | Performed by: SURGERY

## 2024-05-14 NOTE — H&P (VIEW-ONLY)
[facility] - General Surgery Services  [address] [place] [id number]  Phone: [phone]  Fax: [phone]     HISTORY & PHYSICAL  General Surgery  Dr. Julius Adam      Patient Name: Nazario Barber  YOB: 1955  Author: Ema Fernandes, ANP     Date: 05/14/2024                   SUBJECTIVE:     Chief Complaint/Reason for Admission:   Chief Complaint   Patient presents with    Consult     Discuss mediport placement for chemo treatment        History of Present Illness:  Mr. Nazario Barber is a 68 y.o. male that presents to clinic for surgical consideration of Mediport placement. Ready to proceed with placement of Mediport catheter placement.     Mr. Barber has a history of stage III rectal adenocarcinoma and received low anterior resection, but was unable to receive any adjuvant chemotherapy or chemoradiation therapy due to difficulty and delay with wound healing.   Patient was undergoing surveillance had a PET-CT in March 2024 with findings of adenopathy above and below the diaphragm concerning for metastatic disease.  Bx cervical lymph node, pathology c/w with poorly differentiated adenocarcinoma consistent with rectal primary.   Oncology plan: palliative systemic chemotherapy with FOLFOX in first-line to start 5/21/24.     On Eliquis.     Underwent LAR. Experienced post op complications, sepsis, and diverting ostomy Dr. Mike Morse.     Clinical history from previous medical chart below:    Diagnosis:  Rectal adenocarcinoma, pT3 pN2b MX, initially least stage IIIC.  Diagnosed with stage IV disease in April 2024     Treatment history     01/23/2023: low anterior resection    Current treatment:     Plan to initiate FOLFOX on 05/21/2024    67-year-old who had a screening colonoscopy in January 2023 when he was found to have a obstructing mass at 12 cm, biopsy from which revealed invasive adenocarcinoma, concerning for malignancy.  He underwent a low anterior  resection on 01/23/2023, pathology from which revealed invasive adenocarcinoma, pT3 pN2b.  Patient's postop course was complicated with anastomotic leak assess delayed wound healing.  He was referred to our clinic to discuss further treatment recommendations given his diagnosis of stage III rectal cancer.  Patient presented to my clinic on 05/04/2023 to establish care.  Patient declined adjuvant radiation therapy or chemotherapy.  Patient was found to have multiple hypermetabolic foci PET-CT done in March 2024 concerning for metastatic disease.  Biopsy of the left cervical lymph node positive for malignancy consistent with rectal primary.    Referring provider: Adi Lindo MD   PCP: Cory Angel MD     Patient Care Team:  Cory Angel MD as PCP - General (Family Medicine)  Michael Head MD as Consulting Physician (Gastroenterology)  Lamonte Piendo MD as Consulting Physician (Cardiology)  Julius Adam MD as Surgeon (General Surgery)  Mike Morse MD (General Surgery)  [facility] (Cardiovascular Disease)  Adi Lindo MD (Oncology)      Review of Systems:  12 point ROS negative except as stated in HPI    PAST HISTORY:     Past Medical History:   Diagnosis Date    (HFpEF) heart failure with preserved ejection fraction     Atrial fibrillation     Bilateral leg edema     CHF (congestive heart failure)     Colon cancer     TORRES (dyspnea on exertion)     Former smoker     Hypertension     Iron deficiency anemia     Mixed hyperlipidemia     PHT (pulmonary hypertension)     Rectal cancer     Sleep apnea, unspecified     Venous insufficiency      Past Surgical History:   Procedure Laterality Date    COLECTOMY      COLONOSCOPY      COLOSTOMY      LEFT HEART CATHETERIZATION      LOW ANTERIOR RESECTION OF COLON      Radiofrequency ablation of right greater saphenous vein      THORACENTESIS       No family history on file.  Social History  "    Socioeconomic History    Marital status:    Tobacco Use    Smoking status: Former     Types: Cigarettes    Smokeless tobacco: Never   Substance and Sexual Activity    Alcohol use: Not Currently    Drug use: Never       MEDICATIONS & ALLERGIES:     Current Outpatient Medications on File Prior to Visit   Medication Sig    albuterol (ACCUNEB) 1.25 mg/3 mL Nebu Take by nebulization every 8 (eight) hours.    albuterol (PROVENTIL/VENTOLIN HFA) 90 mcg/actuation inhaler 1 puff every 4 (four) hours as needed.    ALPRAZolam (XANAX) 0.5 MG tablet SMARTSI.5 Tablet(s) By Mouth    amiodarone (PACERONE) 200 MG Tab Take 200 mg by mouth.    ELIQUIS 5 mg Tab Take 5 mg by mouth 2 (two) times daily.    FEROSUL 325 mg (65 mg iron) Tab tablet Take by mouth.    ferrous sulfate 325 (65 FE) MG EC tablet 1 tablet Orally Once a day for 90 days    fluticasone propionate (FLONASE) 50 mcg/actuation nasal spray SMARTSI Spray(s) Both Nares Every Morning    folic acid (FOLVITE) 1 MG tablet Take 1,000 mcg by mouth.    furosemide (LASIX) 40 MG tablet SMARTSI Tablet(s) By Mouth Twice a Week PRN    GAVILYTE-G 236-22.74-6.74 -5.86 gram suspension Take by mouth.    lisinopriL 10 MG tablet Take 10 mg by mouth.    pravastatin (PRAVACHOL) 40 MG tablet Take 40 mg by mouth.    SANTYL ointment SMARTSIG:sparingly Topical Daily    STIOLTO RESPIMAT 2.5-2.5 mcg/actuation Mist SMARTSI Puff(s) Via Inhaler Daily    topiramate (TOPAMAX) 100 MG tablet 1 tablet Orally BID for 30 day(s)     No current facility-administered medications on file prior to visit.     Review of patient's allergies indicates:  No Known Allergies    OBJECTIVE:     Vitals:    24 1306   BP: (!) 160/84   Pulse: 68   Weight: 117.9 kg (260 lb)   Height: 5' 6" (1.676 m)     Body mass index is 41.97 kg/m².    Physical Exam:  General:  Well developed, well nourished, no acute distress  HEENT:  Normocephalic, atraumatic, PERRL, EOMI, clear sclera, ears normal, neck supple, " throat clear without erythema or exudates  CVS:  RRR, S1 and S2 normal, no murmurs, rubs, gallops  Resp:  Lungs clear to auscultation, no wheezes, rales, rhonchi, cough  GI:  Abdomen soft, non-tender, non-distended, normoactive bowel sounds, no masses. Colostomy intact.   :  Deferred  MSK:  No muscle atrophy, cyanosis, peripheral edema, full range of motion  Skin:  No rashes, ulcers, erythema  Neuro:  CNII-XII grossly intact  Psych:  Alert and oriented to person, place, and time    Results:  I have independently reviewed all pertinent lab and radiologic studies relevant to general surgery.      VISIT DIAGNOSES:       ICD-10-CM ICD-9-CM   1. Rectal cancer  C20 154.1   2. Poor venous access  I87.8 459.89       ASSESSMENT/PLAN:     Plan:    Mediport Placement     Hold Eliquis pre op    Pre op cardiac clearance    Dr. dAam examined patient, discussed recommendations, obtained informed consent, and answered all questions with patient/family.     RAULITO Ricardo      I, RAULITO Holguin, am scribing for, and in the presence of, Julius Adam MD, performed the above scribed service and the documentation accurately describes the services I performed. I attest to the accuracy of the note.

## 2024-05-14 NOTE — PROGRESS NOTES
[facility] Surgical - General Surgery Services  [address] [place] [id number]  Phone: [phone]  Fax: [phone]     HISTORY & PHYSICAL  General Surgery  Dr. Julius Adam      Patient Name: Nazario Barber  YOB: 1955  Author: Ema Fernandes, ANP     Date: 05/14/2024                   SUBJECTIVE:     Chief Complaint/Reason for Admission:   Chief Complaint   Patient presents with    Consult     Discuss mediport placement for chemo treatment        History of Present Illness:  Mr. Nazario Barber is a 68 y.o. male that presents to clinic for surgical consideration of Mediport placement. Ready to proceed with placement of Mediport catheter placement.     Mr. Barber has a history of stage III rectal adenocarcinoma and received low anterior resection, but was unable to receive any adjuvant chemotherapy or chemoradiation therapy due to difficulty and delay with wound healing.   Patient was undergoing surveillance had a PET-CT in March 2024 with findings of adenopathy above and below the diaphragm concerning for metastatic disease.  Bx cervical lymph node, pathology c/w with poorly differentiated adenocarcinoma consistent with rectal primary.   Oncology plan: palliative systemic chemotherapy with FOLFOX in first-line to start 5/21/24.     On Eliquis.     Underwent LAR. Experienced post op complications, sepsis, and diverting ostomy Dr. Mike Morse.     Clinical history from previous medical chart below:    Diagnosis:  Rectal adenocarcinoma, pT3 pN2b MX, initially least stage IIIC.  Diagnosed with stage IV disease in April 2024     Treatment history     01/23/2023: low anterior resection    Current treatment:     Plan to initiate FOLFOX on 05/21/2024    67-year-old who had a screening colonoscopy in January 2023 when he was found to have a obstructing mass at 12 cm, biopsy from which revealed invasive adenocarcinoma, concerning for malignancy.  He underwent a low anterior  resection on 01/23/2023, pathology from which revealed invasive adenocarcinoma, pT3 pN2b.  Patient's postop course was complicated with anastomotic leak assess delayed wound healing.  He was referred to our clinic to discuss further treatment recommendations given his diagnosis of stage III rectal cancer.  Patient presented to my clinic on 05/04/2023 to establish care.  Patient declined adjuvant radiation therapy or chemotherapy.  Patient was found to have multiple hypermetabolic foci PET-CT done in March 2024 concerning for metastatic disease.  Biopsy of the left cervical lymph node positive for malignancy consistent with rectal primary.    Referring provider: Adi Lindo MD   PCP: Cory Angel MD     Patient Care Team:  Cory Angel MD as PCP - General (Family Medicine)  Michael Head MD as Consulting Physician (Gastroenterology)  Lamonte Pinedo MD as Consulting Physician (Cardiology)  Julius Adam MD as Surgeon (General Surgery)  Mike Morse MD (General Surgery)  [facility] (Cardiovascular Disease)  Adi Lindo MD (Oncology)      Review of Systems:  12 point ROS negative except as stated in HPI    PAST HISTORY:     Past Medical History:   Diagnosis Date    (HFpEF) heart failure with preserved ejection fraction     Atrial fibrillation     Bilateral leg edema     CHF (congestive heart failure)     Colon cancer     TORRES (dyspnea on exertion)     Former smoker     Hypertension     Iron deficiency anemia     Mixed hyperlipidemia     PHT (pulmonary hypertension)     Rectal cancer     Sleep apnea, unspecified     Venous insufficiency      Past Surgical History:   Procedure Laterality Date    COLECTOMY      COLONOSCOPY      COLOSTOMY      LEFT HEART CATHETERIZATION      LOW ANTERIOR RESECTION OF COLON      Radiofrequency ablation of right greater saphenous vein      THORACENTESIS       No family history on file.  Social History  "    Socioeconomic History    Marital status:    Tobacco Use    Smoking status: Former     Types: Cigarettes    Smokeless tobacco: Never   Substance and Sexual Activity    Alcohol use: Not Currently    Drug use: Never       MEDICATIONS & ALLERGIES:     Current Outpatient Medications on File Prior to Visit   Medication Sig    albuterol (ACCUNEB) 1.25 mg/3 mL Nebu Take by nebulization every 8 (eight) hours.    albuterol (PROVENTIL/VENTOLIN HFA) 90 mcg/actuation inhaler 1 puff every 4 (four) hours as needed.    ALPRAZolam (XANAX) 0.5 MG tablet SMARTSI.5 Tablet(s) By Mouth    amiodarone (PACERONE) 200 MG Tab Take 200 mg by mouth.    ELIQUIS 5 mg Tab Take 5 mg by mouth 2 (two) times daily.    FEROSUL 325 mg (65 mg iron) Tab tablet Take by mouth.    ferrous sulfate 325 (65 FE) MG EC tablet 1 tablet Orally Once a day for 90 days    fluticasone propionate (FLONASE) 50 mcg/actuation nasal spray SMARTSI Spray(s) Both Nares Every Morning    folic acid (FOLVITE) 1 MG tablet Take 1,000 mcg by mouth.    furosemide (LASIX) 40 MG tablet SMARTSI Tablet(s) By Mouth Twice a Week PRN    GAVILYTE-G 236-22.74-6.74 -5.86 gram suspension Take by mouth.    lisinopriL 10 MG tablet Take 10 mg by mouth.    pravastatin (PRAVACHOL) 40 MG tablet Take 40 mg by mouth.    SANTYL ointment SMARTSIG:sparingly Topical Daily    STIOLTO RESPIMAT 2.5-2.5 mcg/actuation Mist SMARTSI Puff(s) Via Inhaler Daily    topiramate (TOPAMAX) 100 MG tablet 1 tablet Orally BID for 30 day(s)     No current facility-administered medications on file prior to visit.     Review of patient's allergies indicates:  No Known Allergies    OBJECTIVE:     Vitals:    24 1306   BP: (!) 160/84   Pulse: 68   Weight: 117.9 kg (260 lb)   Height: 5' 6" (1.676 m)     Body mass index is 41.97 kg/m².    Physical Exam:  General:  Well developed, well nourished, no acute distress  HEENT:  Normocephalic, atraumatic, PERRL, EOMI, clear sclera, ears normal, neck supple, " throat clear without erythema or exudates  CVS:  RRR, S1 and S2 normal, no murmurs, rubs, gallops  Resp:  Lungs clear to auscultation, no wheezes, rales, rhonchi, cough  GI:  Abdomen soft, non-tender, non-distended, normoactive bowel sounds, no masses. Colostomy intact.   :  Deferred  MSK:  No muscle atrophy, cyanosis, peripheral edema, full range of motion  Skin:  No rashes, ulcers, erythema  Neuro:  CNII-XII grossly intact  Psych:  Alert and oriented to person, place, and time    Results:  I have independently reviewed all pertinent lab and radiologic studies relevant to general surgery.      VISIT DIAGNOSES:       ICD-10-CM ICD-9-CM   1. Rectal cancer  C20 154.1   2. Poor venous access  I87.8 459.89       ASSESSMENT/PLAN:     Plan:    Mediport Placement     Hold Eliquis pre op    Pre op cardiac clearance    Dr. Adam examined patient, discussed recommendations, obtained informed consent, and answered all questions with patient/family.     RAULITO Ricardo      I, RAULITO Holguin, am scribing for, and in the presence of, Julius Adam MD, performed the above scribed service and the documentation accurately describes the services I performed. I attest to the accuracy of the note.

## 2024-05-15 DIAGNOSIS — C20 RECTAL CANCER: Primary | ICD-10-CM

## 2024-05-15 LAB
OHS QRS DURATION: 84 MS
OHS QTC CALCULATION: 418 MS

## 2024-05-16 ENCOUNTER — OFFICE VISIT (OUTPATIENT)
Dept: HEMATOLOGY/ONCOLOGY | Facility: CLINIC | Age: 69
End: 2024-05-16
Payer: MEDICARE

## 2024-05-16 VITALS
SYSTOLIC BLOOD PRESSURE: 115 MMHG | DIASTOLIC BLOOD PRESSURE: 67 MMHG | OXYGEN SATURATION: 96 % | HEART RATE: 61 BPM | TEMPERATURE: 98 F | HEIGHT: 66 IN | WEIGHT: 260.19 LBS | RESPIRATION RATE: 16 BRPM | BODY MASS INDEX: 41.82 KG/M2

## 2024-05-16 DIAGNOSIS — C20 RECTAL CANCER: Primary | ICD-10-CM

## 2024-05-16 DIAGNOSIS — T45.1X5A CHEMOTHERAPY INDUCED NAUSEA AND VOMITING: Primary | ICD-10-CM

## 2024-05-16 DIAGNOSIS — R11.2 CHEMOTHERAPY INDUCED NAUSEA AND VOMITING: Primary | ICD-10-CM

## 2024-05-16 PROCEDURE — 1160F RVW MEDS BY RX/DR IN RCRD: CPT | Mod: CPTII,,, | Performed by: NURSE PRACTITIONER

## 2024-05-16 PROCEDURE — 3078F DIAST BP <80 MM HG: CPT | Mod: CPTII,,, | Performed by: NURSE PRACTITIONER

## 2024-05-16 PROCEDURE — 1101F PT FALLS ASSESS-DOCD LE1/YR: CPT | Mod: CPTII,,, | Performed by: NURSE PRACTITIONER

## 2024-05-16 PROCEDURE — 4010F ACE/ARB THERAPY RXD/TAKEN: CPT | Mod: CPTII,,, | Performed by: NURSE PRACTITIONER

## 2024-05-16 PROCEDURE — 3288F FALL RISK ASSESSMENT DOCD: CPT | Mod: CPTII,,, | Performed by: NURSE PRACTITIONER

## 2024-05-16 PROCEDURE — 1159F MED LIST DOCD IN RCRD: CPT | Mod: CPTII,,, | Performed by: NURSE PRACTITIONER

## 2024-05-16 PROCEDURE — 3008F BODY MASS INDEX DOCD: CPT | Mod: CPTII,,, | Performed by: NURSE PRACTITIONER

## 2024-05-16 PROCEDURE — 99215 OFFICE O/P EST HI 40 MIN: CPT | Mod: ,,, | Performed by: NURSE PRACTITIONER

## 2024-05-16 PROCEDURE — 3074F SYST BP LT 130 MM HG: CPT | Mod: CPTII,,, | Performed by: NURSE PRACTITIONER

## 2024-05-16 PROCEDURE — 1126F AMNT PAIN NOTED NONE PRSNT: CPT | Mod: CPTII,,, | Performed by: NURSE PRACTITIONER

## 2024-05-16 RX ORDER — PROCHLORPERAZINE MALEATE 5 MG
TABLET ORAL
Qty: 30 TABLET | Refills: 2 | Status: SHIPPED | OUTPATIENT
Start: 2024-05-16

## 2024-05-16 RX ORDER — OLANZAPINE 5 MG/1
TABLET ORAL
Qty: 30 TABLET | Refills: 1 | Status: SHIPPED | OUTPATIENT
Start: 2024-05-16

## 2024-05-16 NOTE — PROGRESS NOTES
Referring provider : Dr. Morse   Reason for consultation : Colon cancer     Diagnosis:  Rectal adenocarcinoma, pT3 pN2b MX, initially least stage IIIC.  Diagnosed with stage IV disease in April 2024    Treatment history    01/23/2023: low anterior resection    Current treatment:    Plan to initiate FOLFOX on 05/21/2024    HPI     67-year-old who had a screening colonoscopy in January 2023 when he was found to have a obstructing mass at 12 cm, biopsy from which revealed invasive adenocarcinoma, concerning for malignancy.  He underwent a low anterior resection on 01/23/2023, pathology from which revealed invasive adenocarcinoma, pT3 pN2b.  Patient's postop course was complicated with anastomotic leak assess delayed wound healing.  He was referred to our clinic to discuss further treatment recommendations given his diagnosis of stage III rectal cancer.  Patient presented to my clinic on 05/04/2023 to establish care.  Patient declined adjuvant radiation therapy or chemotherapy.  Patient was found to have multiple hypermetabolic foci PET-CT done in March 2024 concerning for metastatic disease.  Biopsy of the left cervical lymph node positive for malignancy consistent with rectal primary.  Treatment history as above    Interval history     Today, 05/08/2024, patient denies any acute concerns.  He reports tolerating this biopsy well without any significant side effects.  He denies any new symptoms of pain, decreased appetite or weight loss any blood in his stools or dark tarry stools he denies fevers, chills, drenching night sweats.    Pathology    4/19/24: Left Cervical LN biopsy: Positive for malignancy, consistent with mod to poorly diff metastatic adenocarcinoma     01/30/2023 upper rectum/lower sigmoid colon resection:  Invasive adenocarcinoma, moderately differentiated, tumor invading the perirectal soft tissue, margins negative for tumor.  Ten of 38 lymph nodes positive for metastatic disease.   [id number]      02/06/2023 sigmoid colon resection-portion of perforated colon showing focal mucosal ischemia, vascular congestion, acute inflammation hemorrhage and acute serositis.  Surgical margins showing viable colon with acute serositis.  Three benign lymph nodes negative for tumor.  Negative for malignancy.      Imaging      04/01/2024 ultrasound upper extremity left, no evidence of left upper extremity venous thrombosis    03/27/2024 PET-CT multiple hypermetabolic lymph nodes above and below the diaphragm that are likely metastatic.  Postsurgical changes of rectosigmoid resection left lower quadrant ostomy without evidence of local disease recurrence.  Segmental dilatation of the left brachiocephalic vein with mild to moderate uptake which is concerning for possible underlying thrombus.  Vascular ultrasound is recommended for further evaluation.    02/21/24 CT CAP w/ contrast: A new 6 mm nodule in the right lower lobe not seen on previous imaging may be concerning for metastatic disease. Tiny right thyroid nodule stable, atherosclerotic and degenerative changes. In the abdomen there is no evidence of neoplastic disease. Slight improvement of retroperitoneal adenopathy since 8/8/23 08/08/2023 CT chest abdomen pelvis with contrast:  Decrease in airspace opacity at the medial aspect of the right middle lobe with residual changes most likely representing segmental atelectasis or scarring.  Resolution of bilateral pleural effusions.  Slight decrease in size of nonspecific subcarinal nodes, largest measuring 1 cm, no other significant change, resolution of air-fluid level in the left side of the abdomen with removal of surgical drain.  Stable retroperitoneal adenopathy.  Left lower quadrant periosteal hernia containing small bowel but not resulting obstruction.  Slight decrease in abdominal ascites.  No other significant change.    05/10/2023 CT chest abdomen pelvis with contrast:  Persistent air and fluid  collection in the left anterior lower quadrant intermittently with posterior margin of the adjacent distal colon as well as region of the surgical suture in the distal colonic segment.  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  Overall there is more air unless fluid in this region in comparison to prior exam.  Development of borderline bilateral inguinal and mild retroperitoneal adenopathy.  Nonspecific wall thickening involving the distal colonic segment leading to left sided ostomy.  Possibility of colitis is considered.  This may be reactive secondary to adjacent inflammatory process.  No other significant change.    02/08/23: Recent  intestinal surgery with placement of left sided colostomy and a surgical drain.  Free intraperitoneal air has resolved although scattered free fluid remains.  There is no bowel obstruction or significant bowel dilatation.  No abscess of other organized fluid collection.  New small pleural effusions with greater atelectasis in each lung base.  Superimposed lower lobe infiltrate is possible bilaterally.  Worsening body wall edema.      Laboratory    03/29/2024 CEA 9.1, creatinine 1.26, albumin 3.8, calcium 9.4, LFTs unremarkable, WBC count 6.9, hemoglobin 11.9, MCV 94.3, platelet count 208, ANC 3.76.    02/12/24: CEA 6.2, cr 1.29, alb 3.8, ca 9.40, LFTs WNL, wbc 8.23, hgb 12.8, plt 220, ANC 3.60  11/14/23:  CEA 5.5, Creatinine 1.08, albumin 4.0, calcium 9.4, LFTs within normal limits, WBC count 8.39, hemoglobin 12, MCV 93.9, platelet count 224.    08/11/2023:  CEA 2.5, Creatinine 0.98, albumin 3.7, calcium 9.3, LFTs within normal limits, WBC count 8.13, hemoglobin 11.2, MCV 92.7, platelet count 225.    05/05/2023:  Creatinine 0.83, albumin 2.5, calcium 8.8, alkaline phosphatase 55, total bili 0.9, AST 8, ALT less than 6, CEA 4.0, WBC count 14.6, hemoglobin 8.3, MCV 94.2, platelet  count 3 1 8, ANC 8.96.    Past Medical History:   Diagnosis Date    (HFpEF) heart failure with preserved ejection fraction     Atrial fibrillation     Bilateral leg edema     CHF (congestive heart failure)     Colon cancer     TORRES (dyspnea on exertion)     Former smoker     Hypertension     Iron deficiency anemia     Mixed hyperlipidemia     PHT (pulmonary hypertension)     Rectal cancer     Sleep apnea, unspecified     Venous insufficiency        Past Surgical History:   Procedure Laterality Date    COLECTOMY      COLONOSCOPY      COLOSTOMY      LEFT HEART CATHETERIZATION      LOW ANTERIOR RESECTION OF COLON      Radiofrequency ablation of right greater saphenous vein      THORACENTESIS         No family history on file.    Social History     Socioeconomic History    Marital status:    Tobacco Use    Smoking status: Former     Types: Cigarettes    Smokeless tobacco: Never   Substance and Sexual Activity    Alcohol use: Not Currently    Drug use: Never       Current Outpatient Medications   Medication Sig Dispense Refill    albuterol (ACCUNEB) 1.25 mg/3 mL Nebu Take by nebulization every 8 (eight) hours.      albuterol (PROVENTIL/VENTOLIN HFA) 90 mcg/actuation inhaler 1 puff every 4 (four) hours as needed.      ALPRAZolam (XANAX) 0.5 MG tablet SMARTSI.5 Tablet(s) By Mouth      amiodarone (PACERONE) 200 MG Tab Take 200 mg by mouth.      ELIQUIS 5 mg Tab Take 5 mg by mouth 2 (two) times daily.      FEROSUL 325 mg (65 mg iron) Tab tablet Take 325 mg by mouth once daily.      ferrous sulfate 325 (65 FE) MG EC tablet 1 tablet Orally Once a day for 90 days      fluticasone propionate (FLONASE) 50 mcg/actuation nasal spray SMARTSI Spray(s) Both Nares Every Morning      folic acid (FOLVITE) 1 MG tablet Take 1,000 mcg by mouth once daily.      furosemide (LASIX) 40 MG tablet SMARTSI Tablet(s) By Mouth Twice a Week PRN      GAVILYTE-G 236-22.74-6.74 -5.86 gram suspension Take by mouth.      lisinopriL 10 MG  tablet Take 10 mg by mouth once daily.      OLANZapine (ZYPREXA) 5 MG tablet Take 1 tablet (5 mg ) by mouth in the evening on days 1-3 of each chemotherapy treatment. 30 tablet 1    pravastatin (PRAVACHOL) 40 MG tablet Take 40 mg by mouth every evening.      prochlorperazine (COMPAZINE) 5 MG tablet Take 1 tablet (5 mg total) by mouth every 6 hours as needed for nausea. 30 tablet 2    SANTYL ointment SMARTSIG:sparingly Topical Daily      STIOLTO RESPIMAT 2.5-2.5 mcg/actuation Mist SMARTSI Puff(s) Via Inhaler Daily      topiramate (TOPAMAX) 100 MG tablet 1 tablet Orally BID for 30 day(s)       No current facility-administered medications for this visit.       Review of patient's allergies indicates:  No Known Allergies      Review of systems     CONSTITUTIONAL: no fevers, no chills, no weight loss, no fatigue, no weakness  HEMATOLOGIC: no abnormal bleeding, no abnormal bruising, no drenching night sweats  ONCOLOGIC: no new masses or lumps  HEENT: no vision loss, no tinnitus or hearing loss, no nose bleeding, no dysphagia, no odynophagia  CVS: no chest pain, no palpitations, no dyspnea on exertion  RESP: no shortness of breath, no hemoptysis, no cough  GI: no nausea, no vomiting, no diarrhea, no constipation, no melena, no hematochezia, no hematemesis, no abdominal pain, positive for increase in abdominal girth  : no dysuria, no hematuria, no hesitancy, no scrotal swelling, no discharge  INTEGUMENT: no rashes, no abnormal bruising, no nail pitting, no hyperpigmentation  NEURO: no falls, no memory loss, no paresthesias or dysesthesias, no urofecal incontinence or retention, no loss of strength on any extremity  MSK: no back pain, no new joint pain, no joint swelling  PSYCH: no suicidal or homicidal ideation, no depression, no insomnia, no anhedonia  ENDOCRINE: no heat or cold intolerance, no polyuria, no polydipsia      Physical exam     Vitals:    24 0926   BP: 115/67   Pulse: 61   Resp: 16   Temp: 98.1 °F  (36.7 °C)             ECOG PS 0  GA: AAOx3, NAD  HEENT: NCAT, moist oral mucous membranes  LYMPH: no cervical, axillary or supraclavicular adenopathy, left supraclavicular fullness  CVS: s1s2 RRR, no M/R/G  RESP: CTA b/l, no crackles, no wheezes or rhonchi  ABD: firm and slightly distended, NT, BS+, no hepatosplenomegaly, ostomy in place, healthy stoma.   EXT: no deformities, no pedal edema  SKIN: no rashes, warm and dry  NEURO: normal mentation, strength 5/5 on all 4 extremities, no sensory deficits    Assessment and plan     # Rectal adenocarcinoma, metastatic  Patient had history of stage III rectal adenocarcinoma and received low anterior resection but did not receive any adjuvant chemotherapy or chemoradiation therapy due to difficulty and delay with wound healing   Patient was undergoing surveillance had a PET-CT in March 2024 with findings of adenopathy above and below the diaphragm concerning for metastatic disease   He had a biopsy of the cervical lymph node, pathology from which was consistent with poorly differentiated adenocarcinoma consistent with rectal primary   This is a plan to initiate palliative systemic chemotherapy with FOLFOX in first-line  Patient was agreeable to this plan of care    # Rectal adenocarcinoma, pT3 pN2b MX, at least stage IIIC.  Noted operative report from January 2023 with findings of a palpable mass at peritoneal reflection.  Patient had surgical complications with concerns for perforation since anastomotic leak in February 2023 during another surgery status post sigmoid colon resection.  Status post low anterior resection, anastomotic disruption diverting colostomy, with delayed wound healing.  Discussed with patient and his daughter the diagnosis of rectal adenocarcinoma, treatment recommendations including chemotherapy, chemoradiation therapy and surgical resection.  Patient has had complications from his surgical resection , now with delayed wound healing.  He was 13 weeks  postop during his initial consultation with me.    I do not believe he would benefit from any adjuvant chemotherapy or chemoradiation therapy at this point especially since he is still healing.    Status post radiation oncology consultation recommendations for concurrent chemo RT after treatment for abdominal wall abscess.  He is status post IR for drain placement.  Reviewed CT chest abdomen pelvis IV contrast on in May 2023 without obvious evidence of disease recurrence  Patient was decided not to do chemotherapy concurrently with radiation therapy.    Plan was for active surveillance, with labs every 3-6 months for the 1st 2 years and every 6 months thereafter to complete a total of 5 years along with a CT chest abdomen pelvis with contrast every 6 months for a total of 5 years.  02/21/24 CT CAP SILVINO in the abd/pelivs area. However, a new 6 mm nodule is noted in the right lower lobe concerning for metastatic disease.   PET-CT in March 2024 with adenopathy above and below the diaphragm concerning for disease recurrence.  Reviewed pathology report from cervical biopsy continue with poorly differential adenocarcinoma, rectal primary.         Plan   Chemo education   Port placement urgently   Plan to initiate chemotherapy with FOLFOX in first-line on 05/21/2024, labs the day prior   Follow-up in clinic prior to cycle 1     Mr. Barber is here today with his daughter for his chemotherapy education for metastatic rectal cancer.  This treatment is first-line palliative intent.  He will receive Oxaliplatin, Fluorouracil, Fluorocuracil  pump, and Leucovorin every 14 days.  We discussed the side effects of  all drugs, how to manage the side effects, dosage, timing, and when he should notify the clinic of adverse side effects.  Nausea medication Olanzaqpine 5 mg PO, and prochlorperazine 5 mg PO were sent to his pharmacy with instructions on how to take.  Patient was instructed to purchase OTC Imodium with instructions on how to  take.  [facility] Teaching Sheets were reviewed with the patient and a copy given for his reference.  The patient has our 24-hour clinic number for any questions or problems.  He verbalized the risks and benefits of this treatment.  All questions were answered.  Informed consent was reviewed and signed by the patient.    Patient will need cardiac clearance prior to Mediport insertion.  Date for chemotherapy start is on hold until Mediport insertion.    DISCUSSION:    1.  A total of 60 minutes were spent in counseling today, in which 100% were face-to-face.  At today's therapy teaching session, we discussed the patient's cancer diagnosis as well as planned therapy regimen, protocol, side effects and toxicities.  A handout of each therapeutic agent in the regimen was provided and reviewed in detail.    2.  The following side effects were discussed but not limited to:                a.  Discussed the risk of infection while on therapy related to pancytopenia, specifically a decrease in their white blood cell count.  Instructed to contact our office for temperature >100.4 F, chills, sudden onset cough or shortness of breath, symptoms of a urinary tract infection.                b.  Discussed the risk of anemia. Instructed to contact our office for dizziness, heart palpitations, or extreme or sudden changes in weakness.                c.  Discussed the risk of thrombocytopenia, which increases the risk of bruising or bleeding.  Instructed the patient to contact our office for spontaneous signs of bleeding, including nose bleeds, bleeding from the gums or mouth, blood in sputum, urine or stool and unusual or excessive bruising or rash.                d.  Discussed GI side effects including weight changes, changes in appetite, altered sense of taste, stomatitis, nausea, vomiting, diarrhea, constipation, and heartburn.                e.  Discussed  side effects including painful urination, changes in the amount of  urination, possible urine color changes.  Discussed fertility issues and to prevent  pregnancy if of child bearing age.                f.  Discussed neurological side effects including the risk of peripheral neuropathy, either temporary or permanent.                g.  Discussed the potential for skin, hair, and nail changes.       3.  Instructed to contact our office for discussion of medication changes, the addition of vitamin and/or herbal supplementation as they may interact with some chemotherapy agents.    4.  Discussed dietary modifications and the need to maintain adequate caloric intake and proper oral hydration.  Recommended 64 ounces of fluid per day.    5.  Discussed anti-emetic protocol and bowel regimen protocol.    6.  Office contact information given including after hours number.  Discussed there is an oncologist on call 24/7, 365 days including weekends.  Provided primary nurse's information .    7.  In summary, the patient is in agreement with the plan of care.  Questions appeared to be answered to their satisfaction. Consented the patient to the treatment plan and the patient was educated on the planned duration of the treatment and schedule of the treatment administration. Copy to be scanned into the chart.    All questions answered to the satisfaction of the patient and family.     Follow up appointments given to patient.          The patient is in agreement with today's plan of care.  All questions answered.  Patient is aware to contact our office for any problems or concerns prior to next visit.      Carmel Simon, MSN-ED, APRN, AGACNP-BC, OCN-

## 2024-05-16 NOTE — DISCHARGE INSTRUCTIONS
Dr. Adam's Post Operative Mediport/Implanted Port Insertion Instructions:      Sutured Wound Care Instructions:    - Keep surgical dressing clean and dry for 48 hours post op, then ok to remove dressing and shower.  - Wash incision daily with antibacterial soap and water. Pat dry.   - Do not remove steri strips for 5-7 days post op. After post op day 7, ok to remove steri strips once edges of steri strips begin to curl. Do not keep steri strips in place longer than 10 days after surgery.   - Call Dr. Adam's office with any questions or concerns regarding wound care/incisions. [phone].     Keep all follow-up visits as told by your health care provider. Follow up with your health care provider that provides your infusions/chemotherapy as scheduled. Follow up with Dr. Adam on an as needed based if you have concerns about your port's functionality or wound issues. [phone]        Implanted Port Insertion  Implanted port insertion is a procedure to put in a port and catheter. The port is a device with an injectable disk that can be accessed by your health care provider. The port is connected to a vein in the chest or neck by a small flexible tube (catheter). There are different types of ports. The implanted port may be used as a long-term IV access for:   Medicines, such as chemotherapy.   Fluids.   Liquid nutrition, such as total parenteral nutrition (TPN).  When you have a port, your health care provider can choose to use the port instead of veins in your arms for these procedures.    What are the risks?  Generally, this is a safe procedure. However, problems may occur, including:   Allergic reactions to medicines or dyes.   Damage to other structures or organs.   Infection.   Damage to the blood vessel, bruising, or bleeding at the puncture site.   Blood clot.   Breakdown of the skin over the port.   A collection of air in the chest that can cause one of the lungs to collapse (pneumothorax).  This is rare.    General instructions   Plan to have someone take you home from the hospital or clinic.   If you will be going home right after the procedure, plan to have someone with you for 24 hours.   You may have blood tests.   Do not use any products that contain nicotine or tobacco for at least 4-6 weeks before the procedure. These products include cigarettes, e-cigarettes, and chewing tobacco. If you need help quitting, ask your health care provider.   Ask your health care provider what steps will be taken to help prevent infection. These may include:  ? Removing hair at the surgery site.  ? Washing skin with a germ-killing soap.  ? Taking antibiotic medicine.  What happens during the procedure?     An IV will be inserted into one of your veins.   You will be given one or more of the following:  ? A medicine to help you relax (sedative).  ? A medicine to numb the area (local anesthetic).   Two small incisions will be made to insert the port.  ? One smaller incision will be made in your neck to get access to the vein where the catheter will lie.  ? The other incision will be made in the upper chest. This is where the port will lie.   The procedure may be done using continuous X-ray (fluoroscopy) or other imaging tools for guidance.   The port and catheter will be placed. There may be a small, raised area where the port is.   The port will be flushed with a salt solution (saline), and blood will be drawn to make sure that it is working correctly.   The incisions will be closed.   Bandages (dressings) may be placed over the incisions.  The procedure may vary among health care providers and hospitals.  What happens after the procedure?   Your blood pressure, heart rate, breathing rate, and blood oxygen level will be monitored until you leave the hospital or clinic.   Do not drive for 24 hours if you were given a sedative during your procedure.   You will be given a 's information card for the type of  port that you have. Keep this with you.   Your port will need to be flushed and checked as told by your health care provider, usually every few weeks.   A chest X-ray will be done to:  ? Check the placement of the port.  ? Make sure there is no injury to your lung.  Summary   Implanted port insertion is a procedure to put in a port and catheter.   The implanted port is used as a long-term IV access.   The port will need to be flushed and checked as told by your health care provider, usually every few weeks.   Keep your 's information card with you at all times.  This information is not intended to replace advice given to you by your health care provider. Make sure you discuss any questions you have with your health care provider.  Document Revised: 01/28/2021 Document Reviewed: 07/16/2019  [employer] Patient Education © 2021 [employer]    Implanted Port Insertion, Care After  This sheet gives you information about how to care for yourself after your procedure. Your health care provider may also give you more specific instructions. If you have problems or questions, contact your health care provider.  What can I expect after the procedure?  After the procedure, it is common to have:   Discomfort at the port insertion site.   Bruising on the skin over the port. This should improve over 3-4 days.  Follow these instructions at home:  Port care   After your port is placed, you will get a 's information card. The card has information about your port. Keep this card with you at all times.   Take care of the port as told by your health care provider. Ask your health care provider if you or a family member can get training for taking care of the port at home. A home health care nurse may also take care of the port.   Make sure to remember what type of port you have.  Incision care       Follow instructions from your health care provider about how to take care of your port insertion site. Make sure  you:  ? Wash your hands with soap and water before and after you change your bandage (dressing). If soap and water are not available, use hand .  ? Change your dressing as told by your health care provider.  ? Leave stitches (sutures), skin glue, or adhesive strips in place. These skin closures may need to stay in place for 2 weeks or longer. If adhesive strip edges start to loosen and curl up, you may trim the loose edges. Do not remove adhesive strips completely unless your health care provider tells you to do that.   Check your port insertion site every day for signs of infection. Check for:  ? Redness, swelling, or pain.  ? Fluid or blood.  ? Warmth.  ? Pus or a bad smell.  Activity   Return to your normal activities as told by your health care provider. Ask your health care provider what activities are safe for you.   Do not lift anything that is heavier than 10 lb (4.5 kg), or the limit that you are told, until your health care provider says that it is safe.  General instructions   Take over-the-counter and prescription medicines only as told by your health care provider.   Do not take baths, swim, or use a hot tub until your health care provider for the first 2 weeks post op. You may take showers 48 hours after surgery once surgical dressing able to be removed. You may only be allowed to take sponge baths.    Do not drive for 24 hours if you were given a sedative during your procedure.   Wear a medical alert bracelet in case of an emergency. This will tell any health care providers that you have a port.   Keep all follow-up visits as told by your health care provider. This is important.  Contact a health care provider if:   You cannot flush your port with saline as directed, or you cannot draw blood from the port.   You have a fever or chills.   You have redness, swelling, or pain around your port insertion site.   You have fluid or blood coming from your port insertion site.   Your port insertion  site feels warm to the touch.   You have pus or a bad smell coming from the port insertion site.  Get help right away if:   You have chest pain or shortness of breath.   You have bleeding from your port that you cannot control.  Summary   Take care of the port as told by your health care provider. Keep the 's information card with you at all times.   Change your dressing as told by your health care provider.   Contact a health care provider if you have a fever or chills or if you have redness, swelling, or pain around your port insertion site.   Keep all follow-up visits as told by your health care provider. Follow up with your health care provider that provides your infusions/chemotherapy as scheduled. Follow up with Dr. Adam on an as needed based if you have concerns about your port's functionality or wound issues. [phone]  This information is not intended to replace advice given to you by your health care provider. Make sure you discuss any questions you have with your health care provider.  Document Revised: 07/16/2019 Document Reviewed: 07/16/2019  [employer] Patient Education © 2021 [employer]

## 2024-05-17 ENCOUNTER — HOSPITAL ENCOUNTER (OUTPATIENT)
Facility: HOSPITAL | Age: 69
Discharge: HOME | End: 2024-05-17
Attending: SURGERY | Admitting: SURGERY
Payer: MEDICARE

## 2024-05-17 ENCOUNTER — ANESTHESIA (OUTPATIENT)
Dept: SURGERY | Facility: HOSPITAL | Age: 69
End: 2024-05-17
Payer: MEDICARE

## 2024-05-17 ENCOUNTER — ANESTHESIA EVENT (OUTPATIENT)
Dept: SURGERY | Facility: HOSPITAL | Age: 69
End: 2024-05-17
Payer: MEDICARE

## 2024-05-17 DIAGNOSIS — C20 RECTAL CANCER: ICD-10-CM

## 2024-05-17 PROCEDURE — C1788 PORT, INDWELLING, IMP: HCPCS | Performed by: SURGERY

## 2024-05-17 PROCEDURE — 63600175 PHARM REV CODE 636 W HCPCS: Performed by: ANESTHESIOLOGY

## 2024-05-17 PROCEDURE — 71000033 HC RECOVERY, INTIAL HOUR: Performed by: SURGERY

## 2024-05-17 PROCEDURE — 37000008 HC ANESTHESIA 1ST 15 MINUTES: Performed by: SURGERY

## 2024-05-17 PROCEDURE — D9220A PRA ANESTHESIA: Mod: CRNA,,,

## 2024-05-17 PROCEDURE — 25000003 PHARM REV CODE 250

## 2024-05-17 PROCEDURE — 77001 FLUOROGUIDE FOR VEIN DEVICE: CPT | Mod: 26,,, | Performed by: SURGERY

## 2024-05-17 PROCEDURE — 63600175 PHARM REV CODE 636 W HCPCS: Performed by: SURGERY

## 2024-05-17 PROCEDURE — D9220A PRA ANESTHESIA: Mod: ANES,,, | Performed by: ANESTHESIOLOGY

## 2024-05-17 PROCEDURE — 63600175 PHARM REV CODE 636 W HCPCS

## 2024-05-17 PROCEDURE — 36000707: Performed by: SURGERY

## 2024-05-17 PROCEDURE — 36561 INSERT TUNNELED CV CATH: CPT | Mod: LT,,, | Performed by: SURGERY

## 2024-05-17 PROCEDURE — 25000003 PHARM REV CODE 250: Performed by: NURSE PRACTITIONER

## 2024-05-17 PROCEDURE — 63600175 PHARM REV CODE 636 W HCPCS: Performed by: NURSE PRACTITIONER

## 2024-05-17 PROCEDURE — 25000003 PHARM REV CODE 250: Performed by: SURGERY

## 2024-05-17 PROCEDURE — 36000706: Performed by: SURGERY

## 2024-05-17 PROCEDURE — 37000009 HC ANESTHESIA EA ADD 15 MINS: Performed by: SURGERY

## 2024-05-17 PROCEDURE — 71000015 HC POSTOP RECOV 1ST HR: Performed by: SURGERY

## 2024-05-17 DEVICE — IMPLANTABLE DEVICE: Type: IMPLANTABLE DEVICE | Site: CHEST | Status: FUNCTIONAL

## 2024-05-17 RX ORDER — CEFAZOLIN SODIUM 1 G/3ML
1 INJECTION, POWDER, FOR SOLUTION INTRAMUSCULAR; INTRAVENOUS
Status: DISCONTINUED | OUTPATIENT
Start: 2024-05-17 | End: 2024-05-17 | Stop reason: HOSPADM

## 2024-05-17 RX ORDER — LABETALOL HYDROCHLORIDE 5 MG/ML
10 INJECTION, SOLUTION INTRAVENOUS
Status: DISCONTINUED | OUTPATIENT
Start: 2024-05-17 | End: 2024-05-17 | Stop reason: HOSPADM

## 2024-05-17 RX ORDER — SODIUM CHLORIDE, SODIUM LACTATE, POTASSIUM CHLORIDE, CALCIUM CHLORIDE 600; 310; 30; 20 MG/100ML; MG/100ML; MG/100ML; MG/100ML
INJECTION, SOLUTION INTRAVENOUS CONTINUOUS
Status: DISCONTINUED | OUTPATIENT
Start: 2024-05-17 | End: 2024-05-17 | Stop reason: HOSPADM

## 2024-05-17 RX ORDER — ACETAMINOPHEN 500 MG
1000 TABLET ORAL
Status: DISCONTINUED | OUTPATIENT
Start: 2024-05-17 | End: 2024-05-17

## 2024-05-17 RX ORDER — BUPIVACAINE HYDROCHLORIDE AND EPINEPHRINE 2.5; 5 MG/ML; UG/ML
INJECTION, SOLUTION EPIDURAL; INFILTRATION; INTRACAUDAL; PERINEURAL
Status: DISCONTINUED
Start: 2024-05-17 | End: 2024-05-17 | Stop reason: HOSPADM

## 2024-05-17 RX ORDER — HYDROMORPHONE HYDROCHLORIDE 2 MG/ML
0.4 INJECTION, SOLUTION INTRAMUSCULAR; INTRAVENOUS; SUBCUTANEOUS EVERY 5 MIN PRN
Status: DISCONTINUED | OUTPATIENT
Start: 2024-05-17 | End: 2024-05-17 | Stop reason: HOSPADM

## 2024-05-17 RX ORDER — METHOCARBAMOL 100 MG/ML
1000 INJECTION, SOLUTION INTRAMUSCULAR; INTRAVENOUS ONCE AS NEEDED
Status: COMPLETED | OUTPATIENT
Start: 2024-05-17 | End: 2024-05-17

## 2024-05-17 RX ORDER — GABAPENTIN 300 MG/1
300 CAPSULE ORAL
Status: DISCONTINUED | OUTPATIENT
Start: 2024-05-17 | End: 2024-05-17

## 2024-05-17 RX ORDER — SODIUM CHLORIDE, SODIUM LACTATE, POTASSIUM CHLORIDE, CALCIUM CHLORIDE 600; 310; 30; 20 MG/100ML; MG/100ML; MG/100ML; MG/100ML
1000 INJECTION, SOLUTION INTRAVENOUS ONCE
Status: DISCONTINUED | OUTPATIENT
Start: 2024-05-17 | End: 2024-05-17 | Stop reason: HOSPADM

## 2024-05-17 RX ORDER — CELECOXIB 200 MG/1
200 CAPSULE ORAL
Status: DISCONTINUED | OUTPATIENT
Start: 2024-05-17 | End: 2024-05-17

## 2024-05-17 RX ORDER — ONDANSETRON HYDROCHLORIDE 2 MG/ML
4 INJECTION, SOLUTION INTRAVENOUS DAILY PRN
Status: DISCONTINUED | OUTPATIENT
Start: 2024-05-17 | End: 2024-05-17 | Stop reason: HOSPADM

## 2024-05-17 RX ORDER — HYDROCODONE BITARTRATE AND ACETAMINOPHEN 5; 325 MG/1; MG/1
1 TABLET ORAL
Status: CANCELLED | OUTPATIENT
Start: 2024-05-17

## 2024-05-17 RX ORDER — DEXAMETHASONE SODIUM PHOSPHATE 4 MG/ML
INJECTION, SOLUTION INTRA-ARTICULAR; INTRALESIONAL; INTRAMUSCULAR; INTRAVENOUS; SOFT TISSUE
Status: DISCONTINUED | OUTPATIENT
Start: 2024-05-17 | End: 2024-05-17

## 2024-05-17 RX ORDER — BUPIVACAINE HYDROCHLORIDE AND EPINEPHRINE 2.5; 5 MG/ML; UG/ML
INJECTION, SOLUTION EPIDURAL; INFILTRATION; INTRACAUDAL; PERINEURAL
Status: DISCONTINUED | OUTPATIENT
Start: 2024-05-17 | End: 2024-05-17 | Stop reason: HOSPADM

## 2024-05-17 RX ORDER — HYDROCODONE BITARTRATE AND ACETAMINOPHEN 5; 325 MG/1; MG/1
1 TABLET ORAL EVERY 6 HOURS PRN
Qty: 8 TABLET | Refills: 0 | Status: SHIPPED | OUTPATIENT
Start: 2024-05-17 | End: 2024-05-17 | Stop reason: SDUPTHER

## 2024-05-17 RX ORDER — GABAPENTIN 300 MG/1
300 CAPSULE ORAL
Status: COMPLETED | OUTPATIENT
Start: 2024-05-17 | End: 2024-05-17

## 2024-05-17 RX ORDER — HEPARIN SODIUM 5000 [USP'U]/ML
INJECTION, SOLUTION INTRAVENOUS; SUBCUTANEOUS
Status: DISCONTINUED | OUTPATIENT
Start: 2024-05-17 | End: 2024-05-17 | Stop reason: HOSPADM

## 2024-05-17 RX ORDER — CELECOXIB 200 MG/1
200 CAPSULE ORAL
Status: COMPLETED | OUTPATIENT
Start: 2024-05-17 | End: 2024-05-17

## 2024-05-17 RX ORDER — ONDANSETRON 4 MG/1
4 TABLET, ORALLY DISINTEGRATING ORAL
Status: DISCONTINUED | OUTPATIENT
Start: 2024-05-17 | End: 2024-05-17

## 2024-05-17 RX ORDER — ACETAMINOPHEN 500 MG
1000 TABLET ORAL
Status: COMPLETED | OUTPATIENT
Start: 2024-05-17 | End: 2024-05-17

## 2024-05-17 RX ORDER — MIDAZOLAM HYDROCHLORIDE 2 MG/2ML
2 INJECTION, SOLUTION INTRAMUSCULAR; INTRAVENOUS
Status: DISCONTINUED | OUTPATIENT
Start: 2024-05-17 | End: 2024-05-17 | Stop reason: HOSPADM

## 2024-05-17 RX ORDER — PROCHLORPERAZINE EDISYLATE 5 MG/ML
5 INJECTION INTRAMUSCULAR; INTRAVENOUS EVERY 4 HOURS PRN
Status: DISCONTINUED | OUTPATIENT
Start: 2024-05-17 | End: 2024-05-17 | Stop reason: HOSPADM

## 2024-05-17 RX ORDER — LIDOCAINE HYDROCHLORIDE 10 MG/ML
INJECTION INFILTRATION; PERINEURAL
Status: DISCONTINUED
Start: 2024-05-17 | End: 2024-05-17 | Stop reason: WASHOUT

## 2024-05-17 RX ORDER — LIDOCAINE HYDROCHLORIDE 10 MG/ML
INJECTION, SOLUTION EPIDURAL; INFILTRATION; INTRACAUDAL; PERINEURAL
Status: DISCONTINUED | OUTPATIENT
Start: 2024-05-17 | End: 2024-05-17

## 2024-05-17 RX ORDER — ONDANSETRON HYDROCHLORIDE 2 MG/ML
INJECTION, SOLUTION INTRAMUSCULAR; INTRAVENOUS
Status: DISCONTINUED | OUTPATIENT
Start: 2024-05-17 | End: 2024-05-17

## 2024-05-17 RX ORDER — FENTANYL CITRATE 50 UG/ML
INJECTION, SOLUTION INTRAMUSCULAR; INTRAVENOUS
Status: DISCONTINUED | OUTPATIENT
Start: 2024-05-17 | End: 2024-05-17

## 2024-05-17 RX ORDER — MORPHINE SULFATE 4 MG/ML
1 INJECTION, SOLUTION INTRAMUSCULAR; INTRAVENOUS
Status: DISCONTINUED | OUTPATIENT
Start: 2024-05-17 | End: 2024-05-17 | Stop reason: HOSPADM

## 2024-05-17 RX ORDER — HYDROCODONE BITARTRATE AND ACETAMINOPHEN 7.5; 325 MG/1; MG/1
1 TABLET ORAL EVERY 6 HOURS PRN
Status: DISCONTINUED | OUTPATIENT
Start: 2024-05-17 | End: 2024-05-17 | Stop reason: HOSPADM

## 2024-05-17 RX ORDER — HEPARIN SODIUM 5000 [USP'U]/ML
INJECTION, SOLUTION INTRAVENOUS; SUBCUTANEOUS
Status: DISCONTINUED
Start: 2024-05-17 | End: 2024-05-17 | Stop reason: HOSPADM

## 2024-05-17 RX ORDER — ONDANSETRON 4 MG/1
4 TABLET, ORALLY DISINTEGRATING ORAL
Status: COMPLETED | OUTPATIENT
Start: 2024-05-17 | End: 2024-05-17

## 2024-05-17 RX ORDER — TRAMADOL HYDROCHLORIDE 50 MG/1
50 TABLET ORAL EVERY 6 HOURS PRN
Status: DISCONTINUED | OUTPATIENT
Start: 2024-05-17 | End: 2024-05-17 | Stop reason: HOSPADM

## 2024-05-17 RX ORDER — PROPOFOL 10 MG/ML
VIAL (ML) INTRAVENOUS
Status: DISCONTINUED | OUTPATIENT
Start: 2024-05-17 | End: 2024-05-17

## 2024-05-17 RX ORDER — MEPERIDINE HYDROCHLORIDE 25 MG/ML
6.25 INJECTION INTRAMUSCULAR; INTRAVENOUS; SUBCUTANEOUS ONCE AS NEEDED
Status: DISCONTINUED | OUTPATIENT
Start: 2024-05-17 | End: 2024-05-17 | Stop reason: HOSPADM

## 2024-05-17 RX ORDER — PROCHLORPERAZINE EDISYLATE 5 MG/ML
5 INJECTION INTRAMUSCULAR; INTRAVENOUS EVERY 30 MIN PRN
Status: DISCONTINUED | OUTPATIENT
Start: 2024-05-17 | End: 2024-05-17 | Stop reason: HOSPADM

## 2024-05-17 RX ORDER — PROMETHAZINE HYDROCHLORIDE 25 MG/ML
12.5 INJECTION, SOLUTION INTRAMUSCULAR; INTRAVENOUS EVERY 6 HOURS PRN
Status: DISCONTINUED | OUTPATIENT
Start: 2024-05-17 | End: 2024-05-17 | Stop reason: HOSPADM

## 2024-05-17 RX ORDER — HYDROCODONE BITARTRATE AND ACETAMINOPHEN 5; 325 MG/1; MG/1
1 TABLET ORAL EVERY 6 HOURS PRN
Qty: 8 TABLET | Refills: 0 | Status: SHIPPED | OUTPATIENT
Start: 2024-05-17

## 2024-05-17 RX ORDER — SODIUM CHLORIDE, SODIUM GLUCONATE, SODIUM ACETATE, POTASSIUM CHLORIDE AND MAGNESIUM CHLORIDE 30; 37; 368; 526; 502 MG/100ML; MG/100ML; MG/100ML; MG/100ML; MG/100ML
INJECTION, SOLUTION INTRAVENOUS CONTINUOUS
Status: DISCONTINUED | OUTPATIENT
Start: 2024-05-17 | End: 2024-05-17 | Stop reason: HOSPADM

## 2024-05-17 RX ORDER — ONDANSETRON HYDROCHLORIDE 2 MG/ML
4 INJECTION, SOLUTION INTRAVENOUS EVERY 6 HOURS PRN
Status: DISCONTINUED | OUTPATIENT
Start: 2024-05-17 | End: 2024-05-17 | Stop reason: HOSPADM

## 2024-05-17 RX ADMIN — FENTANYL CITRATE 25 MCG: 50 INJECTION, SOLUTION INTRAMUSCULAR; INTRAVENOUS at 11:05

## 2024-05-17 RX ADMIN — CEFAZOLIN 1 G: 330 INJECTION, POWDER, FOR SOLUTION INTRAMUSCULAR; INTRAVENOUS at 10:05

## 2024-05-17 RX ADMIN — HYDROCODONE BITARTRATE AND ACETAMINOPHEN 1 TABLET: 7.5; 325 TABLET ORAL at 12:05

## 2024-05-17 RX ADMIN — ONDANSETRON 4 MG: 2 INJECTION INTRAMUSCULAR; INTRAVENOUS at 10:05

## 2024-05-17 RX ADMIN — MIDAZOLAM HYDROCHLORIDE 2 MG: 1 INJECTION, SOLUTION INTRAMUSCULAR; INTRAVENOUS at 10:05

## 2024-05-17 RX ADMIN — CELECOXIB 200 MG: 200 CAPSULE ORAL at 08:05

## 2024-05-17 RX ADMIN — SODIUM CHLORIDE, POTASSIUM CHLORIDE, SODIUM LACTATE AND CALCIUM CHLORIDE: 600; 310; 30; 20 INJECTION, SOLUTION INTRAVENOUS at 10:05

## 2024-05-17 RX ADMIN — FENTANYL CITRATE 25 MCG: 50 INJECTION, SOLUTION INTRAMUSCULAR; INTRAVENOUS at 10:05

## 2024-05-17 RX ADMIN — LIDOCAINE HYDROCHLORIDE 50 MG: 10 INJECTION, SOLUTION EPIDURAL; INFILTRATION; INTRACAUDAL; PERINEURAL at 10:05

## 2024-05-17 RX ADMIN — PROPOFOL 150 MG: 10 INJECTION, EMULSION INTRAVENOUS at 10:05

## 2024-05-17 RX ADMIN — ONDANSETRON 4 MG: 4 TABLET, ORALLY DISINTEGRATING ORAL at 08:05

## 2024-05-17 RX ADMIN — GABAPENTIN 300 MG: 300 CAPSULE ORAL at 08:05

## 2024-05-17 RX ADMIN — ACETAMINOPHEN 1000 MG: 500 TABLET ORAL at 08:05

## 2024-05-17 RX ADMIN — DEXAMETHASONE SODIUM PHOSPHATE 8 MG: 4 INJECTION, SOLUTION INTRA-ARTICULAR; INTRALESIONAL; INTRAMUSCULAR; INTRAVENOUS; SOFT TISSUE at 10:05

## 2024-05-17 RX ADMIN — METHOCARBAMOL 1000 MG: 100 INJECTION INTRAMUSCULAR; INTRAVENOUS at 11:05

## 2024-05-17 NOTE — ANESTHESIA PROCEDURE NOTES
Intubation    Date/Time: 5/17/2024 10:17 AM    Performed by: Cintia Christiansen CRNA  Authorized by: Lisa Cash MD    Intubation:     Induction:  Intravenous    Intubated:  Postinduction    Mask Ventilation:  Easy mask    Attempts:  1    Attempted By:  CRNA    Difficult Airway Encountered?: No      Airway Device:  Supraglottic airway/LMA    Airway Device Size:  4.0    Style/Cuff Inflation:  Cuffed (inflated to minimal occlusive pressure)    Secured at:  The lips    Placement Verified By:  Capnometry    Complicating Factors:  None    Findings Post-Intubation:  BS equal bilateral

## 2024-05-17 NOTE — INTERVAL H&P NOTE
The patient has been examined and the H&P has been reviewed:    I concur with the findings and no changes have occurred since H&P was written.    Procedure risks, benefits and alternative options discussed and understood by patient/family.    Mediport placement.     Dr. Adam examined patient, discussed recommendations, obtained informed consent.       Active Hospital Problems    Diagnosis  POA    *Rectal cancer [C20]  Yes      Resolved Hospital Problems   No resolved problems to display.

## 2024-05-17 NOTE — ANESTHESIA POSTPROCEDURE EVALUATION
Anesthesia Post Evaluation    Patient: Nazario Barber    Procedure(s) Performed: Procedure(s) (LRB):  [id number] (N/A)    Final Anesthesia Type: general      Patient location during evaluation: PACU  Patient participation: Yes- Able to Participate  Level of consciousness: awake and alert  Post-procedure vital signs: reviewed and stable  Pain management: adequate  Airway patency: patent    PONV status at discharge: No PONV  Anesthetic complications: no      Cardiovascular status: hemodynamically stable  Respiratory status: unassisted  Hydration status: euvolemic  Follow-up not needed.              Vitals Value Taken Time   /75 05/17/24 1210   Temp 36 °C (96.8 °F) 05/17/24 1115   Pulse 58 05/17/24 1210   Resp 16 05/17/24 1146   SpO2 98 % 05/17/24 1210         Event Time   Out of Recovery 11:39:48         Pain/Earl Score: Pain Rating Prior to Med Admin: 0 (5/17/2024  8:20 AM)  Earl Score: 10 (5/17/2024 11:40 AM)

## 2024-05-17 NOTE — ANESTHESIA PREPROCEDURE EVALUATION
05/17/2024  Nazario Barber is a 68 y.o., male with   -------------------------------------    (HFpEF) heart failure with preserved ejection fraction    Atrial fibrillation    Bilateral leg edema    CHF (congestive heart failure)    Colon cancer    TORRES (dyspnea on exertion)    Former smoker    Hypertension    Iron deficiency anemia    Mixed hyperlipidemia    PHT (pulmonary hypertension)    Rectal cancer    Sleep apnea, unspecified    Venous insufficiency   Morbid Obesity with BMI of 42    And   ----------------------------    Colectomy    Colonoscopy    Colostomy    Left heart catheterization    Low anterior resection of colon    Radiofrequency ablation of right greater saphenous vein    Thoracentesis       Presents mediport for rectal cancer s/p resection w/colostomy in Feb 2023 now with metastasis shown on most recent PET CT 03/27/2024        Pre-op Assessment    I have reviewed the NPO Status.      Review of Systems  Cardiovascular:     Hypertension       CHF     TORRES         Shortness of Breath Dyspnea On Extertion      Congestive Heart Failure (CHF)                Hypertension     Atrial Fibrillation     Pulmonary:      Shortness of breath  Sleep Apnea     Obstructive Sleep Apnea (NICHELLE).              Latest Reference Range & Units 05/14/24 14:24   WBC 4.50 - 11.50 x10(3)/mcL 8.40   Hemoglobin 14.0 - 18.0 g/dL 12.6 (L)   Hematocrit 42.0 - 52.0 % 38.8 (L)   Platelet Count 130 - 400 x10(3)/mcL 220   Sodium 136 - 145 mmol/L 140   Potassium 3.5 - 5.1 mmol/L 4.9   Chloride 98 - 107 mmol/L 107   CO2 23 - 31 mmol/L 26   BUN 8.4 - 25.7 mg/dL 25.0   Creatinine 0.73 - 1.18 mg/dL 1.06   eGFR mL/min/1.73/m2 >60   Glucose 82 - 115 mg/dL 94   Calcium 8.8 - 10.0 mg/dL 9.5   ALP 40 - 150 unit/L 47   (L): Data is abnormally low    Physical Exam  General: Well nourished, Cooperative, Alert and Oriented    Airway:  Mallampati:  III   Mouth Opening: Normal  TM Distance: Normal  Tongue: Normal  Neck ROM: Normal ROM  Neck: Girth Increased  Pt has left supraclavicular lymphadenopathy  Dental:  Intact    Chest/Lungs:  Clear to auscultation, Normal Respiratory Rate    Heart:  Rate: Normal  Rhythm: Regular Rhythm        Anesthesia Plan  Type of Anesthesia, risks & benefits discussed:    Anesthesia Type: Gen Supraglottic Airway, Gen Natural Airway  Intra-op Monitoring Plan: Standard ASA Monitors  Post Op Pain Control Plan: multimodal analgesia and IV/PO Opioids PRN  Induction:  IV  Airway Plan: Direct, Post-Induction  Informed Consent: Informed consent signed with the Patient and all parties understand the risks and agree with anesthesia plan.  All questions answered.   ASA Score: 3  Day of Surgery Review of History & Physical: H&P Update referred to the surgeon/provider.  Anesthesia Plan Notes: Premedication: Midazolam  Special Technique: Preemptive analgesia with neurontin, zofran, acetaminophen and celebrex or tramadol  General natural airway verus LMA with OG tube  PONV prophylaxis    Ready For Surgery From Anesthesia Perspective.     .

## 2024-05-17 NOTE — TRANSFER OF CARE
Anesthesia Transfer of Care Note    Patient: Nazario Barber    Procedure(s) Performed: Procedure(s) (LRB):  [id number] (N/A)    Patient location: PACU    Anesthesia Type: general    Transport from OR: Transported from OR on room air with adequate spontaneous ventilation    Post pain: adequate analgesia    Post assessment: no apparent anesthetic complications and tolerated procedure well    Post vital signs: stable    Level of consciousness: responds to stimulation and awake    Nausea/Vomiting: no nausea/vomiting    Complications: none    Transfer of care protocol was followed      Last vitals:

## 2024-05-17 NOTE — BRIEF OP NOTE
[facility] Surgical - Periop Services  Brief Operative Note    Surgery Date: 5/17/2024     Surgeons and Role:     * Julius Adam MD - Primary    Assisting Surgeon: RAULITO Ricardo      Pre-op Diagnosis:  Rectal cancer [C20]    Post-op Diagnosis:  Post-Op Diagnosis Codes:     * Rectal cancer [C20]    Procedure: Mediport placement right IJ , fluoroscopy less than 1 hour    Anesthesia: General, LMA    Operative Findings: dictated per surgeon    Estimated Blood Loss: 20 cc         Specimens:   Specimen (24h ago, onward)      None              Discharge Note    OUTCOME: Patient tolerated treatment/procedure well without complication and is now ready for discharge.    DISPOSITION: Home or Self Care    FINAL DIAGNOSIS:  Rectal cancer    FOLLOWUP: In clinic    DISCHARGE INSTRUCTIONS:  DC instructions given to pt

## 2024-05-17 NOTE — OP NOTE
[facility] Surgical - Periop Services  Surgery Department  Operative Note    SUMMARY     Date of Procedure: 5/17/2024     Procedure: Procedure(s) (LRB):  [id number] (N/A)     Surgeons and Role:     * Julius Adam MD - Primary    Assisting Surgeon: None    Pre-Operative Diagnosis: Rectal cancer [C20]    Post-Operative Diagnosis: Post-Op Diagnosis Codes:     * Rectal cancer [C20]    Anesthesia: Monitor Anesthesia Care    Operative Findings (including complications, if any):      Description of Technical Procedures: After informed consent, patient was taken to the OR.  The patient was given anesthetic and the neck and chest were prepped in the usual fashion.    Left SC approach attempted and easily found Left SC vein however wire wound not pass.  A finder needle was placed in the neck to ID the Right IJ.  This was removed and a larger needle was placed.  The vein was found easily with one stick.  The guide wire was easily passed and this was confirmed with fluoro.  A pocket was created and the port was tunneled into position.  The catheter was placed through a peel away dilator sheath.  The sheath was removed.  The catheter was in excellent position.   Excellent flow through catheter was demonstrated.   The wounds were closed with Vicryl.  CXR will be obtained.      Significant Surgical Tasks Conducted by the Assistant(s), if Applicable:      Estimated Blood Loss (EBL): * No values recorded between 5/17/2024 10:35 AM and 5/17/2024 11:10 AM *           Implants:   Implant Name Type Inv. Item Serial No.  Lot No. LRB No. Used Action   [email or web address]    [employer] [id number] Right 1 Implanted       Specimens:   Specimen (24h ago, onward)      None                    Condition: Good    Disposition: PACU - hemodynamically stable.    Attestation: I was present and scrubbed for the entire procedure.

## 2024-05-17 NOTE — PLAN OF CARE
VSS, kylie score  9, pt arousable and ready for transfer to [facility] per Dr LISBETH Cash.

## 2024-05-18 VITALS
SYSTOLIC BLOOD PRESSURE: 138 MMHG | DIASTOLIC BLOOD PRESSURE: 75 MMHG | BODY MASS INDEX: 41.88 KG/M2 | WEIGHT: 260.56 LBS | RESPIRATION RATE: 16 BRPM | TEMPERATURE: 97 F | OXYGEN SATURATION: 98 % | HEIGHT: 66 IN | HEART RATE: 58 BPM

## 2024-05-21 ENCOUNTER — OFFICE VISIT (OUTPATIENT)
Dept: HEMATOLOGY/ONCOLOGY | Facility: CLINIC | Age: 69
End: 2024-05-21
Payer: MEDICARE

## 2024-05-21 VITALS
TEMPERATURE: 99 F | OXYGEN SATURATION: 95 % | SYSTOLIC BLOOD PRESSURE: 110 MMHG | DIASTOLIC BLOOD PRESSURE: 59 MMHG | HEIGHT: 66 IN | HEART RATE: 58 BPM | RESPIRATION RATE: 18 BRPM | BODY MASS INDEX: 42.29 KG/M2 | WEIGHT: 263.13 LBS

## 2024-05-21 DIAGNOSIS — Z51.11 ENCOUNTER FOR ANTINEOPLASTIC CHEMOTHERAPY: ICD-10-CM

## 2024-05-21 DIAGNOSIS — C20 RECTAL CANCER: Primary | ICD-10-CM

## 2024-05-21 PROCEDURE — 1126F AMNT PAIN NOTED NONE PRSNT: CPT | Mod: CPTII,,, | Performed by: STUDENT IN AN ORGANIZED HEALTH CARE EDUCATION/TRAINING PROGRAM

## 2024-05-21 PROCEDURE — 4010F ACE/ARB THERAPY RXD/TAKEN: CPT | Mod: CPTII,,, | Performed by: STUDENT IN AN ORGANIZED HEALTH CARE EDUCATION/TRAINING PROGRAM

## 2024-05-21 PROCEDURE — 1159F MED LIST DOCD IN RCRD: CPT | Mod: CPTII,,, | Performed by: STUDENT IN AN ORGANIZED HEALTH CARE EDUCATION/TRAINING PROGRAM

## 2024-05-21 PROCEDURE — 3078F DIAST BP <80 MM HG: CPT | Mod: CPTII,,, | Performed by: STUDENT IN AN ORGANIZED HEALTH CARE EDUCATION/TRAINING PROGRAM

## 2024-05-21 PROCEDURE — 99215 OFFICE O/P EST HI 40 MIN: CPT | Mod: ,,, | Performed by: STUDENT IN AN ORGANIZED HEALTH CARE EDUCATION/TRAINING PROGRAM

## 2024-05-21 PROCEDURE — 3008F BODY MASS INDEX DOCD: CPT | Mod: CPTII,,, | Performed by: STUDENT IN AN ORGANIZED HEALTH CARE EDUCATION/TRAINING PROGRAM

## 2024-05-21 PROCEDURE — 3074F SYST BP LT 130 MM HG: CPT | Mod: CPTII,,, | Performed by: STUDENT IN AN ORGANIZED HEALTH CARE EDUCATION/TRAINING PROGRAM

## 2024-05-21 PROCEDURE — 1101F PT FALLS ASSESS-DOCD LE1/YR: CPT | Mod: CPTII,,, | Performed by: STUDENT IN AN ORGANIZED HEALTH CARE EDUCATION/TRAINING PROGRAM

## 2024-05-21 PROCEDURE — 3288F FALL RISK ASSESSMENT DOCD: CPT | Mod: CPTII,,, | Performed by: STUDENT IN AN ORGANIZED HEALTH CARE EDUCATION/TRAINING PROGRAM

## 2024-05-21 RX ORDER — SODIUM CHLORIDE 0.9 % (FLUSH) 0.9 %
10 SYRINGE (ML) INJECTION
Status: CANCELLED | OUTPATIENT
Start: 2024-05-21

## 2024-05-21 RX ORDER — SODIUM CHLORIDE 0.9 % (FLUSH) 0.9 %
10 SYRINGE (ML) INJECTION
Status: CANCELLED | OUTPATIENT
Start: 2024-05-23

## 2024-05-21 RX ORDER — HEPARIN 100 UNIT/ML
500 SYRINGE INTRAVENOUS
Status: CANCELLED | OUTPATIENT
Start: 2024-05-23

## 2024-05-21 RX ORDER — DIPHENHYDRAMINE HYDROCHLORIDE 50 MG/ML
50 INJECTION INTRAMUSCULAR; INTRAVENOUS ONCE AS NEEDED
Status: CANCELLED | OUTPATIENT
Start: 2024-05-21

## 2024-05-21 RX ORDER — FLUOROURACIL 50 MG/ML
2400 INJECTION, SOLUTION INTRAVENOUS
Status: CANCELLED | OUTPATIENT
Start: 2024-05-21

## 2024-05-21 RX ORDER — FLUOROURACIL 50 MG/ML
400 INJECTION, SOLUTION INTRAVENOUS
Status: CANCELLED | OUTPATIENT
Start: 2024-05-21

## 2024-05-21 RX ORDER — PROCHLORPERAZINE EDISYLATE 5 MG/ML
5 INJECTION INTRAMUSCULAR; INTRAVENOUS ONCE AS NEEDED
Status: CANCELLED | OUTPATIENT
Start: 2024-05-23

## 2024-05-21 RX ORDER — PROCHLORPERAZINE EDISYLATE 5 MG/ML
5 INJECTION INTRAMUSCULAR; INTRAVENOUS ONCE AS NEEDED
Status: CANCELLED | OUTPATIENT
Start: 2024-05-21

## 2024-05-21 RX ORDER — HEPARIN 100 UNIT/ML
500 SYRINGE INTRAVENOUS
Status: CANCELLED | OUTPATIENT
Start: 2024-05-21

## 2024-05-21 RX ORDER — EPINEPHRINE 0.3 MG/.3ML
0.3 INJECTION SUBCUTANEOUS ONCE AS NEEDED
Status: CANCELLED | OUTPATIENT
Start: 2024-05-21

## 2024-05-21 NOTE — PROGRESS NOTES
Referring provider : Dr. Morse   Reason for consultation : Colon cancer     Diagnosis:  Rectal adenocarcinoma, pT3 pN2b MX, initially least stage IIIC.  Diagnosed with stage IV disease in April 2024    Treatment history    01/23/2023: low anterior resection    Current treatment:    05/21/2024- current: FOLFOX    HPI     67-year-old who had a screening colonoscopy in January 2023 when he was found to have a obstructing mass at 12 cm, biopsy from which revealed invasive adenocarcinoma, concerning for malignancy.  He underwent a low anterior resection on 01/23/2023, pathology from which revealed invasive adenocarcinoma, pT3 pN2b.  Patient's postop course was complicated with anastomotic leak assess delayed wound healing.  He was referred to our clinic to discuss further treatment recommendations given his diagnosis of stage III rectal cancer.  Patient presented to my clinic on 05/04/2023 to establish care.  Patient declined adjuvant radiation therapy or chemotherapy.  Patient was found to have multiple hypermetabolic foci PET-CT done in March 2024 concerning for metastatic disease.  Biopsy of the left cervical lymph node positive for malignancy consistent with rectal primary.  Treatment history as above    Interval history     Today,  05/21/2024, patient denies any acute concerns.  He reports tolerating  port placement well without any significant side effects.  He denies any new symptoms of pain, decreased appetite or weight loss any blood in his stools or dark tarry stools he denies fevers, chills, drenching night sweats.    Pathology    4/19/24: Left Cervical LN biopsy: Positive for malignancy, consistent with mod to poorly diff metastatic adenocarcinoma     01/30/2023 upper rectum/lower sigmoid colon resection:  Invasive adenocarcinoma, moderately differentiated, tumor invading the perirectal soft tissue, margins negative for tumor.  Ten of 38 lymph nodes positive for metastatic disease.   [id number]      02/06/2023 sigmoid colon resection-portion of perforated colon showing focal mucosal ischemia, vascular congestion, acute inflammation hemorrhage and acute serositis.  Surgical margins showing viable colon with acute serositis.  Three benign lymph nodes negative for tumor.  Negative for malignancy.      Imaging      04/01/2024 ultrasound upper extremity left, no evidence of left upper extremity venous thrombosis    03/27/2024 PET-CT multiple hypermetabolic lymph nodes above and below the diaphragm that are likely metastatic.  Postsurgical changes of rectosigmoid resection left lower quadrant ostomy without evidence of local disease recurrence.  Segmental dilatation of the left brachiocephalic vein with mild to moderate uptake which is concerning for possible underlying thrombus.  Vascular ultrasound is recommended for further evaluation.    02/21/24 CT CAP w/ contrast: A new 6 mm nodule in the right lower lobe not seen on previous imaging may be concerning for metastatic disease. Tiny right thyroid nodule stable, atherosclerotic and degenerative changes. In the abdomen there is no evidence of neoplastic disease. Slight improvement of retroperitoneal adenopathy since 8/8/23 08/08/2023 CT chest abdomen pelvis with contrast:  Decrease in airspace opacity at the medial aspect of the right middle lobe with residual changes most likely representing segmental atelectasis or scarring.  Resolution of bilateral pleural effusions.  Slight decrease in size of nonspecific subcarinal nodes, largest measuring 1 cm, no other significant change, resolution of air-fluid level in the left side of the abdomen with removal of surgical drain.  Stable retroperitoneal adenopathy.  Left lower quadrant periosteal hernia containing small bowel but not resulting obstruction.  Slight decrease in abdominal ascites.  No other significant change.    05/10/2023 CT chest abdomen pelvis with contrast:  Persistent air and fluid  collection in the left anterior lower quadrant intermittently with posterior margin of the adjacent distal colon as well as region of the surgical suture in the distal colonic segment.  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  Overall there is more air unless fluid in this region in comparison to prior exam.  Development of borderline bilateral inguinal and mild retroperitoneal adenopathy.  Nonspecific wall thickening involving the distal colonic segment leading to left sided ostomy.  Possibility of colitis is considered.  This may be reactive secondary to adjacent inflammatory process.  No other significant change.    02/08/23: Recent  intestinal surgery with placement of left sided colostomy and a surgical drain.  Free intraperitoneal air has resolved although scattered free fluid remains.  There is no bowel obstruction or significant bowel dilatation.  No abscess of other organized fluid collection.  New small pleural effusions with greater atelectasis in each lung base.  Superimposed lower lobe infiltrate is possible bilaterally.  Worsening body wall edema.      Laboratory    05/20/2024  CEA 7.1, creatinine 0.9, albumin 3.7, calcium 9.2, LFTs unremarkable, WBC count 10.3, hemoglobin 11.8, MCV 95.8, platelet count 202, ANC 6.0.    03/29/2024 CEA 9.1, creatinine 1.26, albumin 3.8, calcium 9.4, LFTs unremarkable, WBC count 6.9, hemoglobin 11.9, MCV 94.3, platelet count 208, ANC 3.76.    02/12/24: CEA 6.2, cr 1.29, alb 3.8, ca 9.40, LFTs WNL, wbc 8.23, hgb 12.8, plt 220, ANC 3.60  11/14/23:  CEA 5.5, Creatinine 1.08, albumin 4.0, calcium 9.4, LFTs within normal limits, WBC count 8.39, hemoglobin 12, MCV 93.9, platelet count 224.    08/11/2023:  CEA 2.5, Creatinine 0.98, albumin 3.7, calcium 9.3, LFTs within normal limits, WBC count 8.13, hemoglobin 11.2, MCV 92.7, platelet count 225.    05/05/2023:  Creatinine  0.83, albumin 2.5, calcium 8.8, alkaline phosphatase 55, total bili 0.9, AST 8, ALT less than 6, CEA 4.0, WBC count 14.6, hemoglobin 8.3, MCV 94.2, platelet count 3 1 8, ANC 8.96.    Past Medical History:   Diagnosis Date    (HFpEF) heart failure with preserved ejection fraction     Atrial fibrillation     Bilateral leg edema     CHF (congestive heart failure)     Colon cancer     TORRES (dyspnea on exertion)     Former smoker     Hypertension     Iron deficiency anemia     Mixed hyperlipidemia     PHT (pulmonary hypertension)     Rectal cancer     Sleep apnea, unspecified     Venous insufficiency        Past Surgical History:   Procedure Laterality Date    COLECTOMY      COLONOSCOPY      COLOSTOMY      INSERTION OF TUNNELED CENTRAL VENOUS CATHETER (CVC) WITH SUBCUTANEOUS PORT N/A 5/17/2024    Procedure: [id number];  Surgeon: Julius Adam MD;  Location: [facility];  Service: General;  Laterality: N/A;    LEFT HEART CATHETERIZATION      LOW ANTERIOR RESECTION OF COLON      Radiofrequency ablation of right greater saphenous vein      THORACENTESIS         No family history on file.    Social History     Socioeconomic History    Marital status:    Tobacco Use    Smoking status: Former     Types: Cigarettes    Smokeless tobacco: Never   Substance and Sexual Activity    Alcohol use: Not Currently    Drug use: Never       Current Outpatient Medications   Medication Sig Dispense Refill    albuterol (ACCUNEB) 1.25 mg/3 mL Nebu Take by nebulization every 8 (eight) hours.      albuterol (PROVENTIL/VENTOLIN HFA) 90 mcg/actuation inhaler 1 puff every 4 (four) hours as needed.      amiodarone (PACERONE) 200 MG Tab Take 200 mg by mouth.      ELIQUIS 5 mg Tab Take 5 mg by mouth 2 (two) times daily.      FEROSUL 325 mg (65 mg iron) Tab tablet Take 325 mg by mouth once daily.      ferrous sulfate 325 (65 FE) MG EC tablet 1 tablet Orally Once a day for 90 days      fluticasone propionate (FLONASE) 50 mcg/actuation  nasal spray SMARTSI Spray(s) Both Nares Every Morning      folic acid (FOLVITE) 1 MG tablet Take 1,000 mcg by mouth once daily.      furosemide (LASIX) 40 MG tablet SMARTSI Tablet(s) By Mouth Twice a Week PRN      GAVILYTE-G 236-22.74-6.74 -5.86 gram suspension Take by mouth.      HYDROcodone-acetaminophen (NORCO) 5-325 mg per tablet Take 1 tablet by mouth every 6 (six) hours as needed for Pain. 8 tablet 0    lisinopriL 10 MG tablet Take 10 mg by mouth once daily.      OLANZapine (ZYPREXA) 5 MG tablet Take 1 tablet (5 mg ) by mouth in the evening on days 1-3 of each chemotherapy treatment. 30 tablet 1    pravastatin (PRAVACHOL) 40 MG tablet Take 40 mg by mouth every evening.      prochlorperazine (COMPAZINE) 5 MG tablet Take 1 tablet (5 mg total) by mouth every 6 hours as needed for nausea. 30 tablet 2    SANTYL ointment SMARTSIG:sparingly Topical Daily      STIOLTO RESPIMAT 2.5-2.5 mcg/actuation Mist SMARTSI Puff(s) Via Inhaler Daily      topiramate (TOPAMAX) 100 MG tablet 1 tablet Orally BID for 30 day(s)       No current facility-administered medications for this visit.       Review of patient's allergies indicates:  No Known Allergies      Review of systems     CONSTITUTIONAL: no fevers, no chills, no weight loss, no fatigue, no weakness  HEMATOLOGIC: no abnormal bleeding, no abnormal bruising, no drenching night sweats  ONCOLOGIC: no new masses or lumps  HEENT: no vision loss, no tinnitus or hearing loss, no nose bleeding, no dysphagia, no odynophagia  CVS: no chest pain, no palpitations, no dyspnea on exertion  RESP: no shortness of breath, no hemoptysis, no cough  GI: no nausea, no vomiting, no diarrhea, no constipation, no melena, no hematochezia, no hematemesis, no abdominal pain, positive for increase in abdominal girth  : no dysuria, no hematuria, no hesitancy, no scrotal swelling, no discharge  INTEGUMENT: no rashes, no abnormal bruising, no nail pitting, no hyperpigmentation  NEURO: no falls,  no memory loss, no paresthesias or dysesthesias, no urofecal incontinence or retention, no loss of strength on any extremity  MSK: no back pain, no new joint pain, no joint swelling  PSYCH: no suicidal or homicidal ideation, no depression, no insomnia, no anhedonia  ENDOCRINE: no heat or cold intolerance, no polyuria, no polydipsia      Physical exam     Vitals:    05/21/24 0833   BP: (!) 110/59   Pulse: (!) 58   Resp: 18   Temp: 98.5 °F (36.9 °C)           ECOG PS 0  GA: AAOx3, NAD  HEENT: NCAT, moist oral mucous membranes  LYMPH: no cervical, axillary or supraclavicular adenopathy, left supraclavicular fullness  CVS: s1s2 RRR, no M/R/G  RESP: CTA b/l, no crackles, no wheezes or rhonchi  ABD: firm and slightly distended, NT, BS+, no hepatosplenomegaly, ostomy in place, healthy stoma.   EXT: no deformities, no pedal edema  SKIN: no rashes, warm and dry  NEURO: normal mentation, strength 5/5 on all 4 extremities, no sensory deficits    Assessment and plan     # Rectal adenocarcinoma, metastatic  Patient had history of stage III rectal adenocarcinoma and received low anterior resection but did not receive any adjuvant chemotherapy or chemoradiation therapy due to difficulty and delay with wound healing   Patient was undergoing surveillance had a PET-CT in March 2024 with findings of adenopathy above and below the diaphragm concerning for metastatic disease   He had a biopsy of the cervical lymph node, pathology from which was consistent with poorly differentiated adenocarcinoma consistent with rectal primary   This is a plan to initiate palliative systemic chemotherapy with FOLFOX in first-line  Patient was agreeable to this plan of care  Patient initiated FOLFOX on 05/21/24    # Rectal adenocarcinoma, pT3 pN2b MX, at least stage IIIC.  Noted operative report from January 2023 with findings of a palpable mass at peritoneal reflection.  Patient had surgical complications with concerns for perforation since anastomotic  leak in February 2023 during another surgery status post sigmoid colon resection.  Status post low anterior resection, anastomotic disruption diverting colostomy, with delayed wound healing.  Discussed with patient and his daughter the diagnosis of rectal adenocarcinoma, treatment recommendations including chemotherapy, chemoradiation therapy and surgical resection.  Patient has had complications from his surgical resection , now with delayed wound healing.  He was 13 weeks postop during his initial consultation with me.    I do not believe he would benefit from any adjuvant chemotherapy or chemoradiation therapy at this point especially since he is still healing.    Status post radiation oncology consultation recommendations for concurrent chemo RT after treatment for abdominal wall abscess.  He is status post IR for drain placement.  Reviewed CT chest abdomen pelvis IV contrast on in May 2023 without obvious evidence of disease recurrence  Patient was decided not to do chemotherapy concurrently with radiation therapy.    Plan was for active surveillance, with labs every 3-6 months for the 1st 2 years and every 6 months thereafter to complete a total of 5 years along with a CT chest abdomen pelvis with contrast every 6 months for a total of 5 years.  02/21/24 CT CAP SILVINO in the abd/pelivs area. However, a new 6 mm nodule is noted in the right lower lobe concerning for metastatic disease.   PET-CT in March 2024 with adenopathy above and below the diaphragm concerning for disease recurrence.  Reviewed pathology report from cervical biopsy continue with poorly differential adenocarcinoma, rectal primary.         Plan   Reviewed labs, proceed with cycle 1 FOLFOX today   Plan to follow-up in 2 weeks, labs including CEA the day prior      Portions of the record may have been created with voice recognition software. Occasional wrong-word or sound-a-like substitutions may have occurred due to the inherent limitations of  voice recognition software.

## 2024-06-04 ENCOUNTER — OFFICE VISIT (OUTPATIENT)
Dept: HEMATOLOGY/ONCOLOGY | Facility: CLINIC | Age: 69
End: 2024-06-04
Payer: MEDICARE

## 2024-06-04 VITALS
BODY MASS INDEX: 41.44 KG/M2 | OXYGEN SATURATION: 97 % | RESPIRATION RATE: 18 BRPM | DIASTOLIC BLOOD PRESSURE: 72 MMHG | HEIGHT: 66 IN | HEART RATE: 62 BPM | TEMPERATURE: 98 F | WEIGHT: 257.88 LBS | SYSTOLIC BLOOD PRESSURE: 127 MMHG

## 2024-06-04 DIAGNOSIS — C20 RECTAL CANCER: Primary | ICD-10-CM

## 2024-06-04 PROCEDURE — 3288F FALL RISK ASSESSMENT DOCD: CPT | Mod: CPTII,,,

## 2024-06-04 PROCEDURE — 99215 OFFICE O/P EST HI 40 MIN: CPT | Mod: ,,,

## 2024-06-04 PROCEDURE — 1101F PT FALLS ASSESS-DOCD LE1/YR: CPT | Mod: CPTII,,,

## 2024-06-04 PROCEDURE — 3074F SYST BP LT 130 MM HG: CPT | Mod: CPTII,,,

## 2024-06-04 PROCEDURE — 3008F BODY MASS INDEX DOCD: CPT | Mod: CPTII,,,

## 2024-06-04 PROCEDURE — 1159F MED LIST DOCD IN RCRD: CPT | Mod: CPTII,,,

## 2024-06-04 PROCEDURE — 3078F DIAST BP <80 MM HG: CPT | Mod: CPTII,,,

## 2024-06-04 PROCEDURE — 4010F ACE/ARB THERAPY RXD/TAKEN: CPT | Mod: CPTII,,,

## 2024-06-04 RX ORDER — SODIUM CHLORIDE 0.9 % (FLUSH) 0.9 %
10 SYRINGE (ML) INJECTION
Status: CANCELLED | OUTPATIENT
Start: 2024-06-06

## 2024-06-04 RX ORDER — PROCHLORPERAZINE EDISYLATE 5 MG/ML
5 INJECTION INTRAMUSCULAR; INTRAVENOUS ONCE AS NEEDED
Status: CANCELLED | OUTPATIENT
Start: 2024-06-06

## 2024-06-04 RX ORDER — EPINEPHRINE 0.3 MG/.3ML
0.3 INJECTION SUBCUTANEOUS ONCE AS NEEDED
Status: CANCELLED | OUTPATIENT
Start: 2024-06-04

## 2024-06-04 RX ORDER — SODIUM CHLORIDE 0.9 % (FLUSH) 0.9 %
10 SYRINGE (ML) INJECTION
Status: CANCELLED | OUTPATIENT
Start: 2024-06-04

## 2024-06-04 RX ORDER — PROCHLORPERAZINE EDISYLATE 5 MG/ML
5 INJECTION INTRAMUSCULAR; INTRAVENOUS ONCE AS NEEDED
Status: CANCELLED | OUTPATIENT
Start: 2024-06-04

## 2024-06-04 RX ORDER — DIPHENHYDRAMINE HYDROCHLORIDE 50 MG/ML
50 INJECTION INTRAMUSCULAR; INTRAVENOUS ONCE AS NEEDED
Status: CANCELLED | OUTPATIENT
Start: 2024-06-04

## 2024-06-04 RX ORDER — HEPARIN 100 UNIT/ML
500 SYRINGE INTRAVENOUS
Status: CANCELLED | OUTPATIENT
Start: 2024-06-06

## 2024-06-04 RX ORDER — HEPARIN 100 UNIT/ML
500 SYRINGE INTRAVENOUS
Status: CANCELLED | OUTPATIENT
Start: 2024-06-04

## 2024-06-04 RX ORDER — FLUOROURACIL 50 MG/ML
2400 INJECTION, SOLUTION INTRAVENOUS
Status: CANCELLED | OUTPATIENT
Start: 2024-06-04

## 2024-06-04 RX ORDER — FLUOROURACIL 50 MG/ML
400 INJECTION, SOLUTION INTRAVENOUS
Status: CANCELLED | OUTPATIENT
Start: 2024-06-04

## 2024-06-04 NOTE — PROGRESS NOTES
Referring provider : Dr. Morse   Reason for consultation : Colon cancer     Diagnosis:  Rectal adenocarcinoma, pT3 pN2b MX, initially least stage IIIC.  Diagnosed with stage IV disease in April 2024    Treatment history    01/23/2023: low anterior resection    Current treatment:    05/21/2024- current: FOLFOX    HPI     67-year-old who had a screening colonoscopy in January 2023 when he was found to have a obstructing mass at 12 cm, biopsy from which revealed invasive adenocarcinoma, concerning for malignancy.  He underwent a low anterior resection on 01/23/2023, pathology from which revealed invasive adenocarcinoma, pT3 pN2b.  Patient's postop course was complicated with anastomotic leak assess delayed wound healing.  He was referred to our clinic to discuss further treatment recommendations given his diagnosis of stage III rectal cancer.  Patient presented to my clinic on 05/04/2023 to establish care.  Patient declined adjuvant radiation therapy or chemotherapy.  Patient was found to have multiple hypermetabolic foci PET-CT done in March 2024 concerning for metastatic disease.  Biopsy of the left cervical lymph node positive for malignancy consistent with rectal primary.  Treatment history as above    Interval history     Today,  06/04/2024, patient denies any acute concerns.  He reports tolerating cycle 1 FOLFOX well without any significant side effects.  He denies any new symptoms of pain, decreased appetite or weight loss any blood in his stools or dark tarry stools he denies fevers, chills, drenching night sweats.    Pathology    4/19/24: Left Cervical LN biopsy: Positive for malignancy, consistent with mod to poorly diff metastatic adenocarcinoma     01/30/2023 upper rectum/lower sigmoid colon resection:  Invasive adenocarcinoma, moderately differentiated, tumor invading the perirectal soft tissue, margins negative for tumor.  Ten of 38 lymph nodes positive for metastatic disease.  [id number]      02/06/2023  sigmoid colon resection-portion of perforated colon showing focal mucosal ischemia, vascular congestion, acute inflammation hemorrhage and acute serositis.  Surgical margins showing viable colon with acute serositis.  Three benign lymph nodes negative for tumor.  Negative for malignancy.      Imaging      04/01/2024 ultrasound upper extremity left, no evidence of left upper extremity venous thrombosis    03/27/2024 PET-CT multiple hypermetabolic lymph nodes above and below the diaphragm that are likely metastatic.  Postsurgical changes of rectosigmoid resection left lower quadrant ostomy without evidence of local disease recurrence.  Segmental dilatation of the left brachiocephalic vein with mild to moderate uptake which is concerning for possible underlying thrombus.  Vascular ultrasound is recommended for further evaluation.    02/21/24 CT CAP w/ contrast: A new 6 mm nodule in the right lower lobe not seen on previous imaging may be concerning for metastatic disease. Tiny right thyroid nodule stable, atherosclerotic and degenerative changes. In the abdomen there is no evidence of neoplastic disease. Slight improvement of retroperitoneal adenopathy since 8/8/23 08/08/2023 CT chest abdomen pelvis with contrast:  Decrease in airspace opacity at the medial aspect of the right middle lobe with residual changes most likely representing segmental atelectasis or scarring.  Resolution of bilateral pleural effusions.  Slight decrease in size of nonspecific subcarinal nodes, largest measuring 1 cm, no other significant change, resolution of air-fluid level in the left side of the abdomen with removal of surgical drain.  Stable retroperitoneal adenopathy.  Left lower quadrant periosteal hernia containing small bowel but not resulting obstruction.  Slight decrease in abdominal ascites.  No other significant change.    05/10/2023 CT chest abdomen pelvis with contrast:  Persistent air and fluid collection in the left anterior  lower quadrant intermittently with posterior margin of the adjacent distal colon as well as region of the surgical suture in the distal colonic segment.  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  Overall there is more air unless fluid in this region in comparison to prior exam.  Development of borderline bilateral inguinal and mild retroperitoneal adenopathy.  Nonspecific wall thickening involving the distal colonic segment leading to left sided ostomy.  Possibility of colitis is considered.  This may be reactive secondary to adjacent inflammatory process.  No other significant change.    02/08/23: Recent  intestinal surgery with placement of left sided colostomy and a surgical drain.  Free intraperitoneal air has resolved although scattered free fluid remains.  There is no bowel obstruction or significant bowel dilatation.  No abscess of other organized fluid collection.  New small pleural effusions with greater atelectasis in each lung base.  Superimposed lower lobe infiltrate is possible bilaterally.  Worsening body wall edema.      Laboratory    06/03/24: CEA 5.6, CMP unremarkable, mag 2.1, phosphorus 3.3, wbc 8.31, hgb 12.0, plt 140, ANC 4.89  05/20/2024  CEA 7.1, creatinine 0.9, albumin 3.7, calcium 9.2, LFTs unremarkable, WBC count 10.3, hemoglobin 11.8, MCV 95.8, platelet count 202, ANC 6.0.    03/29/2024 CEA 9.1, creatinine 1.26, albumin 3.8, calcium 9.4, LFTs unremarkable, WBC count 6.9, hemoglobin 11.9, MCV 94.3, platelet count 208, ANC 3.76.    02/12/24: CEA 6.2, cr 1.29, alb 3.8, ca 9.40, LFTs WNL, wbc 8.23, hgb 12.8, plt 220, ANC 3.60  11/14/23:  CEA 5.5, Creatinine 1.08, albumin 4.0, calcium 9.4, LFTs within normal limits, WBC count 8.39, hemoglobin 12, MCV 93.9, platelet count 224.    08/11/2023:  CEA 2.5, Creatinine 0.98, albumin 3.7, calcium 9.3, LFTs within normal limits, WBC count 8.13,  hemoglobin 11.2, MCV 92.7, platelet count 225.    05/05/2023:  Creatinine 0.83, albumin 2.5, calcium 8.8, alkaline phosphatase 55, total bili 0.9, AST 8, ALT less than 6, CEA 4.0, WBC count 14.6, hemoglobin 8.3, MCV 94.2, platelet count 3 1 8, ANC 8.96.    Past Medical History:   Diagnosis Date    (HFpEF) heart failure with preserved ejection fraction     Atrial fibrillation     Bilateral leg edema     CHF (congestive heart failure)     Colon cancer     TORRES (dyspnea on exertion)     Former smoker     Hypertension     Iron deficiency anemia     Mixed hyperlipidemia     PHT (pulmonary hypertension)     Rectal cancer     Sleep apnea, unspecified     Venous insufficiency        Past Surgical History:   Procedure Laterality Date    COLECTOMY      COLONOSCOPY      COLOSTOMY      INSERTION OF TUNNELED CENTRAL VENOUS CATHETER (CVC) WITH SUBCUTANEOUS PORT N/A 5/17/2024    Procedure: [id number];  Surgeon: Julius Adam MD;  Location: [facility];  Service: General;  Laterality: N/A;    LEFT HEART CATHETERIZATION      LOW ANTERIOR RESECTION OF COLON      Radiofrequency ablation of right greater saphenous vein      THORACENTESIS         No family history on file.    Social History     Socioeconomic History    Marital status:    Tobacco Use    Smoking status: Former     Types: Cigarettes    Smokeless tobacco: Never   Substance and Sexual Activity    Alcohol use: Not Currently    Drug use: Never       Current Outpatient Medications   Medication Sig Dispense Refill    albuterol (ACCUNEB) 1.25 mg/3 mL Nebu Take by nebulization every 8 (eight) hours.      albuterol (PROVENTIL/VENTOLIN HFA) 90 mcg/actuation inhaler 1 puff every 4 (four) hours as needed.      amiodarone (PACERONE) 200 MG Tab Take 200 mg by mouth.      ELIQUIS 5 mg Tab Take 5 mg by mouth 2 (two) times daily.      FEROSUL 325 mg (65 mg iron) Tab tablet Take 325 mg by mouth once daily.      ferrous sulfate 325 (65 FE) MG EC tablet 1 tablet Orally Once  a day for 90 days      fluticasone propionate (FLONASE) 50 mcg/actuation nasal spray SMARTSI Spray(s) Both Nares Every Morning      folic acid (FOLVITE) 1 MG tablet Take 1,000 mcg by mouth once daily.      furosemide (LASIX) 40 MG tablet SMARTSI Tablet(s) By Mouth Twice a Week PRN      GAVILYTE-G 236-22.74-6.74 -5.86 gram suspension Take by mouth.      HYDROcodone-acetaminophen (NORCO) 5-325 mg per tablet Take 1 tablet by mouth every 6 (six) hours as needed for Pain. 8 tablet 0    lisinopriL 10 MG tablet Take 10 mg by mouth once daily.      OLANZapine (ZYPREXA) 5 MG tablet Take 1 tablet (5 mg ) by mouth in the evening on days 1-3 of each chemotherapy treatment. 30 tablet 1    pravastatin (PRAVACHOL) 40 MG tablet Take 40 mg by mouth every evening.      prochlorperazine (COMPAZINE) 5 MG tablet Take 1 tablet (5 mg total) by mouth every 6 hours as needed for nausea. 30 tablet 2    SANTYL ointment SMARTSIG:sparingly Topical Daily      STIOLTO RESPIMAT 2.5-2.5 mcg/actuation Mist SMARTSI Puff(s) Via Inhaler Daily      topiramate (TOPAMAX) 100 MG tablet 1 tablet Orally BID for 30 day(s)       No current facility-administered medications for this visit.       Review of patient's allergies indicates:  No Known Allergies      Review of systems     CONSTITUTIONAL: no fevers, no chills, no weight loss, no fatigue, no weakness  HEMATOLOGIC: no abnormal bleeding, no abnormal bruising, no drenching night sweats  ONCOLOGIC: no new masses or lumps  HEENT: no vision loss, no tinnitus or hearing loss, no nose bleeding, no dysphagia, no odynophagia  CVS: no chest pain, no palpitations, no dyspnea on exertion  RESP: no shortness of breath, no hemoptysis, no cough  GI: no nausea, no vomiting, no diarrhea, no constipation, no melena, no hematochezia, no hematemesis, no abdominal pain, positive for increase in abdominal girth  : no dysuria, no hematuria, no hesitancy, no scrotal swelling, no discharge  INTEGUMENT: no rashes, no  abnormal bruising, no nail pitting, no hyperpigmentation  NEURO: no falls, no memory loss, no paresthesias or dysesthesias, no urofecal incontinence or retention, no loss of strength on any extremity  MSK: no back pain, no new joint pain, no joint swelling  PSYCH: no suicidal or homicidal ideation, no depression, no insomnia, no anhedonia  ENDOCRINE: no heat or cold intolerance, no polyuria, no polydipsia      Physical exam     Vitals:    06/04/24 0853   BP: 127/72   Pulse: 62   Resp: 18   Temp: 98.3 °F (36.8 °C)             ECOG PS 0  GA: AAOx3, NAD  HEENT: NCAT, moist oral mucous membranes  LYMPH: no cervical, axillary or supraclavicular adenopathy, left supraclavicular fullness  CVS: s1s2 RRR, no M/R/G  RESP: CTA b/l, no crackles, no wheezes or rhonchi  ABD: firm and slightly distended, NT, BS+, no hepatosplenomegaly, ostomy in place, healthy stoma.   EXT: no deformities, no pedal edema  SKIN: no rashes, warm and dry  NEURO: normal mentation, strength 5/5 on all 4 extremities, no sensory deficits    Assessment and plan     # Rectal adenocarcinoma, metastatic  Patient had history of stage III rectal adenocarcinoma and received low anterior resection but did not receive any adjuvant chemotherapy or chemoradiation therapy due to difficulty and delay with wound healing   Patient was undergoing surveillance had a PET-CT in March 2024 with findings of adenopathy above and below the diaphragm concerning for metastatic disease   He had a biopsy of the cervical lymph node, pathology from which was consistent with poorly differentiated adenocarcinoma consistent with rectal primary   This is a plan to initiate palliative systemic chemotherapy with FOLFOX in first-line  Patient was agreeable to this plan of care  Patient initiated FOLFOX on 05/21/24    # Rectal adenocarcinoma, pT3 pN2b MX, at least stage IIIC.  Noted operative report from January 2023 with findings of a palpable mass at peritoneal reflection.  Patient had  surgical complications with concerns for perforation since anastomotic leak in February 2023 during another surgery status post sigmoid colon resection.  Status post low anterior resection, anastomotic disruption diverting colostomy, with delayed wound healing.  Discussed with patient and his daughter the diagnosis of rectal adenocarcinoma, treatment recommendations including chemotherapy, chemoradiation therapy and surgical resection.  Patient has had complications from his surgical resection , now with delayed wound healing.  He was 13 weeks postop during his initial consultation with me.    I do not believe he would benefit from any adjuvant chemotherapy or chemoradiation therapy at this point especially since he is still healing.    Status post radiation oncology consultation recommendations for concurrent chemo RT after treatment for abdominal wall abscess.  He is status post IR for drain placement.  Reviewed CT chest abdomen pelvis IV contrast on in May 2023 without obvious evidence of disease recurrence  Patient was decided not to do chemotherapy concurrently with radiation therapy.    Plan was for active surveillance, with labs every 3-6 months for the 1st 2 years and every 6 months thereafter to complete a total of 5 years along with a CT chest abdomen pelvis with contrast every 6 months for a total of 5 years.  02/21/24 CT CAP SILVINO in the abd/pelivs area. However, a new 6 mm nodule is noted in the right lower lobe concerning for metastatic disease.   PET-CT in March 2024 with adenopathy above and below the diaphragm concerning for disease recurrence.  Reviewed pathology report from cervical biopsy continue with poorly differential adenocarcinoma, rectal primary.         Plan   Reviewed labs, proceed with cycle 2 FOLFOX today   Plan to follow-up in 2 weeks, labs including CEA the day prior

## 2024-06-13 ENCOUNTER — OFFICE VISIT (OUTPATIENT)
Dept: SURGERY | Facility: CLINIC | Age: 69
End: 2024-06-13
Payer: MEDICARE

## 2024-06-13 VITALS
BODY MASS INDEX: 41.45 KG/M2 | WEIGHT: 257.94 LBS | DIASTOLIC BLOOD PRESSURE: 74 MMHG | HEIGHT: 66 IN | TEMPERATURE: 99 F | SYSTOLIC BLOOD PRESSURE: 115 MMHG | HEART RATE: 77 BPM

## 2024-06-13 DIAGNOSIS — Z48.89 POSTOPERATIVE VISIT: Primary | ICD-10-CM

## 2024-06-13 PROCEDURE — 3074F SYST BP LT 130 MM HG: CPT | Mod: CPTII,,, | Performed by: NURSE PRACTITIONER

## 2024-06-13 PROCEDURE — 1159F MED LIST DOCD IN RCRD: CPT | Mod: CPTII,,, | Performed by: NURSE PRACTITIONER

## 2024-06-13 PROCEDURE — 1101F PT FALLS ASSESS-DOCD LE1/YR: CPT | Mod: CPTII,,, | Performed by: NURSE PRACTITIONER

## 2024-06-13 PROCEDURE — 3078F DIAST BP <80 MM HG: CPT | Mod: CPTII,,, | Performed by: NURSE PRACTITIONER

## 2024-06-13 PROCEDURE — 4010F ACE/ARB THERAPY RXD/TAKEN: CPT | Mod: CPTII,,, | Performed by: NURSE PRACTITIONER

## 2024-06-13 PROCEDURE — 3288F FALL RISK ASSESSMENT DOCD: CPT | Mod: CPTII,,, | Performed by: NURSE PRACTITIONER

## 2024-06-13 PROCEDURE — 1126F AMNT PAIN NOTED NONE PRSNT: CPT | Mod: CPTII,,, | Performed by: NURSE PRACTITIONER

## 2024-06-13 PROCEDURE — 99024 POSTOP FOLLOW-UP VISIT: CPT | Mod: ,,, | Performed by: NURSE PRACTITIONER

## 2024-06-13 NOTE — PROGRESS NOTES
"Nazario Barber \    Review of patient's allergies indicates:  No Known Allergies    Vitals:    06/13/24 1335   BP: 115/74   Pulse: 77   Temp: 98.7 °F (37.1 °C)   Weight: 117 kg (257 lb 15 oz)   Height: 5' 6" (1.676 m)      SUBJECTIVE:  Nazario Barber is a 68 y.o.male who presents for follow up s/p a Mediport placement.  Patient reports that He is not having any issues with the incision, no redness, swelling, or arm pain. Mediport has been flushed with no issues. Pt following lifting restrictions. No further complaints today.     OBJECTIVE:  Integumentary- right chest wall incision healing well.  No erythema or drainage.  Port noted in good position     ASSESSMENT:  S/p Mediport placement     PLAN:  Continue lifting restrictions for 1 more week.  Return to clinic as needed.         "

## 2024-06-18 ENCOUNTER — OFFICE VISIT (OUTPATIENT)
Dept: HEMATOLOGY/ONCOLOGY | Facility: CLINIC | Age: 69
End: 2024-06-18
Payer: MEDICARE

## 2024-06-18 VITALS
RESPIRATION RATE: 14 BRPM | HEIGHT: 66 IN | TEMPERATURE: 98 F | BODY MASS INDEX: 40.87 KG/M2 | HEART RATE: 55 BPM | DIASTOLIC BLOOD PRESSURE: 73 MMHG | WEIGHT: 254.31 LBS | OXYGEN SATURATION: 97 % | SYSTOLIC BLOOD PRESSURE: 113 MMHG

## 2024-06-18 DIAGNOSIS — Z51.11 ENCOUNTER FOR ANTINEOPLASTIC CHEMOTHERAPY: Primary | ICD-10-CM

## 2024-06-18 DIAGNOSIS — C20 RECTAL CANCER: ICD-10-CM

## 2024-06-18 PROCEDURE — 3078F DIAST BP <80 MM HG: CPT | Mod: CPTII,,, | Performed by: NURSE PRACTITIONER

## 2024-06-18 PROCEDURE — 99215 OFFICE O/P EST HI 40 MIN: CPT | Mod: ,,, | Performed by: NURSE PRACTITIONER

## 2024-06-18 PROCEDURE — 1101F PT FALLS ASSESS-DOCD LE1/YR: CPT | Mod: CPTII,,, | Performed by: NURSE PRACTITIONER

## 2024-06-18 PROCEDURE — 3074F SYST BP LT 130 MM HG: CPT | Mod: CPTII,,, | Performed by: NURSE PRACTITIONER

## 2024-06-18 PROCEDURE — 1125F AMNT PAIN NOTED PAIN PRSNT: CPT | Mod: CPTII,,, | Performed by: NURSE PRACTITIONER

## 2024-06-18 PROCEDURE — 1160F RVW MEDS BY RX/DR IN RCRD: CPT | Mod: CPTII,,, | Performed by: NURSE PRACTITIONER

## 2024-06-18 PROCEDURE — 1159F MED LIST DOCD IN RCRD: CPT | Mod: CPTII,,, | Performed by: NURSE PRACTITIONER

## 2024-06-18 PROCEDURE — 3008F BODY MASS INDEX DOCD: CPT | Mod: CPTII,,, | Performed by: NURSE PRACTITIONER

## 2024-06-18 PROCEDURE — 3288F FALL RISK ASSESSMENT DOCD: CPT | Mod: CPTII,,, | Performed by: NURSE PRACTITIONER

## 2024-06-18 PROCEDURE — 4010F ACE/ARB THERAPY RXD/TAKEN: CPT | Mod: CPTII,,, | Performed by: NURSE PRACTITIONER

## 2024-06-18 RX ORDER — SODIUM CHLORIDE 0.9 % (FLUSH) 0.9 %
10 SYRINGE (ML) INJECTION
OUTPATIENT
Start: 2024-06-20

## 2024-06-18 RX ORDER — SODIUM CHLORIDE 0.9 % (FLUSH) 0.9 %
10 SYRINGE (ML) INJECTION
Status: CANCELLED | OUTPATIENT
Start: 2024-06-18

## 2024-06-18 RX ORDER — HEPARIN 100 UNIT/ML
500 SYRINGE INTRAVENOUS
Status: CANCELLED | OUTPATIENT
Start: 2024-06-18

## 2024-06-18 RX ORDER — HEPARIN 100 UNIT/ML
500 SYRINGE INTRAVENOUS
OUTPATIENT
Start: 2024-06-20

## 2024-06-18 RX ORDER — DIPHENHYDRAMINE HYDROCHLORIDE 50 MG/ML
50 INJECTION INTRAMUSCULAR; INTRAVENOUS ONCE AS NEEDED
Status: CANCELLED | OUTPATIENT
Start: 2024-06-18

## 2024-06-18 RX ORDER — PROCHLORPERAZINE EDISYLATE 5 MG/ML
5 INJECTION INTRAMUSCULAR; INTRAVENOUS ONCE AS NEEDED
OUTPATIENT
Start: 2024-06-20

## 2024-06-18 RX ORDER — FLUOROURACIL 50 MG/ML
400 INJECTION, SOLUTION INTRAVENOUS
Status: CANCELLED | OUTPATIENT
Start: 2024-06-18

## 2024-06-18 RX ORDER — PROCHLORPERAZINE EDISYLATE 5 MG/ML
5 INJECTION INTRAMUSCULAR; INTRAVENOUS ONCE AS NEEDED
Status: CANCELLED | OUTPATIENT
Start: 2024-06-18

## 2024-06-18 RX ORDER — FLUOROURACIL 50 MG/ML
2400 INJECTION, SOLUTION INTRAVENOUS
Status: CANCELLED | OUTPATIENT
Start: 2024-06-18

## 2024-06-18 RX ORDER — EPINEPHRINE 0.3 MG/.3ML
0.3 INJECTION SUBCUTANEOUS ONCE AS NEEDED
Status: CANCELLED | OUTPATIENT
Start: 2024-06-18

## 2024-06-18 NOTE — PROGRESS NOTES
Referring provider : Dr. Morse   Reason for consultation : Colon cancer     Diagnosis:  Rectal adenocarcinoma, pT3 pN2b MX, initially least stage IIIC.  Diagnosed with stage IV disease in April 2024    Treatment history    01/23/2023: low anterior resection    Current treatment:    05/21/2024- current: FOLFOX    HPI     67-year-old who had a screening colonoscopy in January 2023 when he was found to have a obstructing mass at 12 cm, biopsy from which revealed invasive adenocarcinoma, concerning for malignancy.  He underwent a low anterior resection on 01/23/2023, pathology from which revealed invasive adenocarcinoma, pT3 pN2b.  Patient's postop course was complicated with anastomotic leak assess delayed wound healing.  He was referred to our clinic to discuss further treatment recommendations given his diagnosis of stage III rectal cancer.  Patient presented to my clinic on 05/04/2023 to establish care.  Patient declined adjuvant radiation therapy or chemotherapy.  Patient was found to have multiple hypermetabolic foci PET-CT done in March 2024 concerning for metastatic disease.  Biopsy of the left cervical lymph node positive for malignancy consistent with rectal primary.  Treatment history as above    Interval history     6/18/2024:  Mr. Barber is here today for follow-up, labs, and cycle 3, day 1 of mFOLFOX6 for his rectal cancer.  Today, he reports doing well and tolerating mFOLFOX6 without any untoward side effects.  He denies any fever, chills, infections, illnesses, nausea, vomiting, skin rash, stomatitis, neuropathy, pain in the abdomen, change in bowel habits, narrow stools, excessive gas, melena, weight loss.  Labs reviewed with patient dated 6/17/2024:  Creatinine 1.22, T. Bili and liver enzymes WNL, CEA 10.2, Mg+ 1.9, Phos+ 2.9, WBC 6.50, Hgb 12.1, Hct 37.1, Plt 102,000.  Eating and drinking.  Weight is stable.  No recent hospitalizations, infections, or illnesses.       Pathology    4/19/24: Left  Cervical LN biopsy: Positive for malignancy, consistent with mod to poorly diff metastatic adenocarcinoma     01/30/2023 upper rectum/lower sigmoid colon resection:  Invasive adenocarcinoma, moderately differentiated, tumor invading the perirectal soft tissue, margins negative for tumor.  Ten of 38 lymph nodes positive for metastatic disease.  [id number]      02/06/2023 sigmoid colon resection-portion of perforated colon showing focal mucosal ischemia, vascular congestion, acute inflammation hemorrhage and acute serositis.  Surgical margins showing viable colon with acute serositis.  Three benign lymph nodes negative for tumor.  Negative for malignancy.      Imaging      04/01/2024 ultrasound upper extremity left, no evidence of left upper extremity venous thrombosis    03/27/2024 PET-CT multiple hypermetabolic lymph nodes above and below the diaphragm that are likely metastatic.  Postsurgical changes of rectosigmoid resection left lower quadrant ostomy without evidence of local disease recurrence.  Segmental dilatation of the left brachiocephalic vein with mild to moderate uptake which is concerning for possible underlying thrombus.  Vascular ultrasound is recommended for further evaluation.    02/21/24 CT CAP w/ contrast: A new 6 mm nodule in the right lower lobe not seen on previous imaging may be concerning for metastatic disease. Tiny right thyroid nodule stable, atherosclerotic and degenerative changes. In the abdomen there is no evidence of neoplastic disease. Slight improvement of retroperitoneal adenopathy since 8/8/23 08/08/2023 CT chest abdomen pelvis with contrast:  Decrease in airspace opacity at the medial aspect of the right middle lobe with residual changes most likely representing segmental atelectasis or scarring.  Resolution of bilateral pleural effusions.  Slight decrease in size of nonspecific subcarinal nodes, largest measuring 1 cm, no other significant change, resolution of air-fluid level in  the left side of the abdomen with removal of surgical drain.  Stable retroperitoneal adenopathy.  Left lower quadrant periosteal hernia containing small bowel but not resulting obstruction.  Slight decrease in abdominal ascites.  No other significant change.    05/10/2023 CT chest abdomen pelvis with contrast:  Persistent air and fluid collection in the left anterior lower quadrant intermittently with posterior margin of the adjacent distal colon as well as region of the surgical suture in the distal colonic segment.  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  Overall there is more air unless fluid in this region in comparison to prior exam.  Development of borderline bilateral inguinal and mild retroperitoneal adenopathy.  Nonspecific wall thickening involving the distal colonic segment leading to left sided ostomy.  Possibility of colitis is considered.  This may be reactive secondary to adjacent inflammatory process.  No other significant change.    02/08/23: Recent  intestinal surgery with placement of left sided colostomy and a surgical drain.  Free intraperitoneal air has resolved although scattered free fluid remains.  There is no bowel obstruction or significant bowel dilatation.  No abscess of other organized fluid collection.  New small pleural effusions with greater atelectasis in each lung base.  Superimposed lower lobe infiltrate is possible bilaterally.  Worsening body wall edema.      Laboratory     6/17/2024:  Creatinine 1.22, T. Bili and liver enzymes WNL, CEA 10.2, Mg+ 1.9, Phos+ 2.9, WBC 6.50, Hgb 12.1, Hct 37.1, Plt 102,000.    06/03/24: CEA 5.6, CMP unremarkable, mag 2.1, phosphorus 3.3, wbc 8.31, hgb 12.0, plt 140, ANC 4.89  05/20/2024  CEA 7.1, creatinine 0.9, albumin 3.7, calcium 9.2, LFTs unremarkable, WBC count 10.3, hemoglobin 11.8, MCV 95.8, platelet count 202, ANC 6.0.    03/29/2024 CEA  9.1, creatinine 1.26, albumin 3.8, calcium 9.4, LFTs unremarkable, WBC count 6.9, hemoglobin 11.9, MCV 94.3, platelet count 208, ANC 3.76.    02/12/24: CEA 6.2, cr 1.29, alb 3.8, ca 9.40, LFTs WNL, wbc 8.23, hgb 12.8, plt 220, ANC 3.60  11/14/23:  CEA 5.5, Creatinine 1.08, albumin 4.0, calcium 9.4, LFTs within normal limits, WBC count 8.39, hemoglobin 12, MCV 93.9, platelet count 224.    08/11/2023:  CEA 2.5, Creatinine 0.98, albumin 3.7, calcium 9.3, LFTs within normal limits, WBC count 8.13, hemoglobin 11.2, MCV 92.7, platelet count 225.    05/05/2023:  Creatinine 0.83, albumin 2.5, calcium 8.8, alkaline phosphatase 55, total bili 0.9, AST 8, ALT less than 6, CEA 4.0, WBC count 14.6, hemoglobin 8.3, MCV 94.2, platelet count 3 1 8, ANC 8.96.    Past Medical History:   Diagnosis Date    (HFpEF) heart failure with preserved ejection fraction     Atrial fibrillation     Bilateral leg edema     CHF (congestive heart failure)     Colon cancer     TORRES (dyspnea on exertion)     Former smoker     Hypertension     Iron deficiency anemia     Mixed hyperlipidemia     PHT (pulmonary hypertension)     Rectal cancer     Sleep apnea, unspecified     Venous insufficiency        Past Surgical History:   Procedure Laterality Date    COLECTOMY      COLONOSCOPY      COLOSTOMY      INSERTION OF TUNNELED CENTRAL VENOUS CATHETER (CVC) WITH SUBCUTANEOUS PORT N/A 5/17/2024    Procedure: [id number];  Surgeon: Julius Adam MD;  Location: [facility] [place];  Service: General;  Laterality: N/A;    LEFT HEART CATHETERIZATION      LOW ANTERIOR RESECTION OF COLON      Radiofrequency ablation of right greater saphenous vein      THORACENTESIS         No family history on file.    Social History     Socioeconomic History    Marital status:    Tobacco Use    Smoking status: Former     Types: Cigarettes    Smokeless tobacco: Never   Substance and Sexual Activity    Alcohol use: Not Currently    Drug use: Never       Current Outpatient  Medications   Medication Sig Dispense Refill    albuterol (ACCUNEB) 1.25 mg/3 mL Nebu Take by nebulization every 8 (eight) hours.      albuterol (PROVENTIL/VENTOLIN HFA) 90 mcg/actuation inhaler 1 puff every 4 (four) hours as needed.      amiodarone (PACERONE) 200 MG Tab Take 200 mg by mouth.      ELIQUIS 5 mg Tab Take 5 mg by mouth 2 (two) times daily.      FEROSUL 325 mg (65 mg iron) Tab tablet Take 325 mg by mouth once daily.      ferrous sulfate 325 (65 FE) MG EC tablet 1 tablet Orally Once a day for 90 days      fluticasone propionate (FLONASE) 50 mcg/actuation nasal spray SMARTSI Spray(s) Both Nares Every Morning      folic acid (FOLVITE) 1 MG tablet Take 1,000 mcg by mouth once daily.      furosemide (LASIX) 40 MG tablet SMARTSI Tablet(s) By Mouth Twice a Week PRN      GAVILYTE-G 236-22.74-6.74 -5.86 gram suspension Take by mouth.      HYDROcodone-acetaminophen (NORCO) 5-325 mg per tablet Take 1 tablet by mouth every 6 (six) hours as needed for Pain. 8 tablet 0    lisinopriL 10 MG tablet Take 10 mg by mouth once daily.      OLANZapine (ZYPREXA) 5 MG tablet Take 1 tablet (5 mg ) by mouth in the evening on days 1-3 of each chemotherapy treatment. 30 tablet 1    pravastatin (PRAVACHOL) 40 MG tablet Take 40 mg by mouth every evening.      prochlorperazine (COMPAZINE) 5 MG tablet Take 1 tablet (5 mg total) by mouth every 6 hours as needed for nausea. 30 tablet 2    SANTYL ointment SMARTSIG:sparingly Topical Daily      STIOLTO RESPIMAT 2.5-2.5 mcg/actuation Mist SMARTSI Puff(s) Via Inhaler Daily      topiramate (TOPAMAX) 100 MG tablet 1 tablet Orally BID for 30 day(s)       No current facility-administered medications for this visit.       Review of patient's allergies indicates:  No Known Allergies      Review of systems     CONSTITUTIONAL: no fevers, no chills, no weight loss, no fatigue, no weakness  HEMATOLOGIC: no abnormal bleeding, no abnormal bruising, no drenching night sweats  ONCOLOGIC: no new  masses or lumps  HEENT: no vision loss, no tinnitus or hearing loss, no nose bleeding, no dysphagia, no odynophagia  CVS: no chest pain, no palpitations, no dyspnea on exertion  RESP: no shortness of breath, no hemoptysis, no cough  GI: no nausea, no vomiting, no diarrhea, no constipation, no melena, no hematochezia, no hematemesis, no abdominal pain, positive for increase in abdominal girth  : no dysuria, no hematuria, no hesitancy, no scrotal swelling, no discharge  INTEGUMENT: no rashes, no abnormal bruising, no nail pitting, no hyperpigmentation  NEURO: no falls, no memory loss, no paresthesias or dysesthesias, no urofecal incontinence or retention, no loss of strength on any extremity  MSK: no back pain, no new joint pain, no joint swelling  PSYCH: no suicidal or homicidal ideation, no depression, no insomnia, no anhedonia  ENDOCRINE: no heat or cold intolerance, no polyuria, no polydipsia      Physical exam     Vitals:    06/18/24 0854   BP: 113/73   Pulse: (!) 55   Resp: 14   Temp: 97.7 °F (36.5 °C)     ECOG PS 0  GA: AAOx3, NAD  HEENT: NCAT, moist oral mucous membranes  LYMPH: no cervical, axillary or supraclavicular adenopathy, left supraclavicular fullness  CVS: s1s2 RRR, no M/R/G  RESP: CTA b/l, no crackles, no wheezes or rhonchi  ABD: firm and slightly distended, NT, BS+, no hepatosplenomegaly, ostomy in place, healthy stoma.   EXT: no deformities, no pedal edema  SKIN: no rashes, warm and dry  NEURO: normal mentation, strength 5/5 on all 4 extremities, no sensory deficits    Assessment and plan     # Rectal adenocarcinoma, metastatic  Patient had history of stage III rectal adenocarcinoma and received low anterior resection but did not receive any adjuvant chemotherapy or chemoradiation therapy due to difficulty and delay with wound healing   Patient was undergoing surveillance had a PET-CT in March 2024 with findings of adenopathy above and below the diaphragm concerning for metastatic disease   He  had a biopsy of the cervical lymph node, pathology from which was consistent with poorly differentiated adenocarcinoma consistent with rectal primary   This is a plan to initiate palliative systemic chemotherapy with FOLFOX in first-line  Patient was agreeable to this plan of care  Patient initiated FOLFOX on 05/21/24    # Rectal adenocarcinoma, pT3 pN2b MX, at least stage IIIC.  Noted operative report from January 2023 with findings of a palpable mass at peritoneal reflection.  Patient had surgical complications with concerns for perforation since anastomotic leak in February 2023 during another surgery status post sigmoid colon resection.  Status post low anterior resection, anastomotic disruption diverting colostomy, with delayed wound healing.  Discussed with patient and his daughter the diagnosis of rectal adenocarcinoma, treatment recommendations including chemotherapy, chemoradiation therapy and surgical resection.  Patient has had complications from his surgical resection , now with delayed wound healing.  He was 13 weeks postop during his initial consultation with me.    I do not believe he would benefit from any adjuvant chemotherapy or chemoradiation therapy at this point especially since he is still healing.    Status post radiation oncology consultation recommendations for concurrent chemo RT after treatment for abdominal wall abscess.  He is status post IR for drain placement.  Reviewed CT chest abdomen pelvis IV contrast on in May 2023 without obvious evidence of disease recurrence  Patient was decided not to do chemotherapy concurrently with radiation therapy.    Plan was for active surveillance, with labs every 3-6 months for the 1st 2 years and every 6 months thereafter to complete a total of 5 years along with a CT chest abdomen pelvis with contrast every 6 months for a total of 5 years.  02/21/24 CT CAP SILVINO in the abd/pelivs area. However, a new 6 mm nodule is noted in the right lower lobe  concerning for metastatic disease.   PET-CT in March 2024 with adenopathy above and below the diaphragm concerning for disease recurrence.  Reviewed pathology report from cervical biopsy continue with poorly differential adenocarcinoma, rectal primary.         Plan   Reviewed labs, proceed with cycle 3, day 1 FOLFOX today.  CEA up from 5.6 to 10.2 from last visit.  Will continue to monitor closely.   Plan to follow-up in 2 weeks, labs including CEA the day prior.      The patient is in agreement with today's plan of care.  All questions answered.  Patient is aware to contact our office for any problems or concerns prior to next visit.      Carmel Simon, MSN-ED, APRN, AGACNP-BC, OCN

## (undated) DEVICE — ELECTRODE PATIENT RETURN DISP

## (undated) DEVICE — SUPPORT ULNA NERVE PROTECTOR

## (undated) DEVICE — SUT VICRYL PLUS 4-0 FS-2 27IN

## (undated) DEVICE — COVER C-ARM STRAP BAND 44X80IN

## (undated) DEVICE — NDL SAFETY 21G X 1 1/2 ECLPSE

## (undated) DEVICE — GLOVE SURG BIOGEL LATEX SZ 7.5

## (undated) DEVICE — NDL SYR 10ML 18X1.5 LL BLUNT

## (undated) DEVICE — NDL 27G X 1 1/4

## (undated) DEVICE — SUT PROLENE 3-0 SH DA 36 BL

## (undated) DEVICE — BAG MEDI-PLAST DECANTER C-FLOW

## (undated) DEVICE — GLOVE SENSICARE PI MICRO 6

## (undated) DEVICE — MATTRESS HOVERMAT TRNSF 39X78

## (undated) DEVICE — STRIP MEDI WND CLSR 1/2X4IN

## (undated) DEVICE — SUT VICRYL 3-0 27 SH

## (undated) DEVICE — SOL NORMAL USPCA 0.9%

## (undated) DEVICE — DRESSING TRANS 4X4 TEGADERM

## (undated) DEVICE — Device

## (undated) DEVICE — ADHESIVE MASTISOL VIAL 48/BX

## (undated) DEVICE — SPONGE LAP 18X18 PREWASHED

## (undated) DEVICE — GOWN POLY REINF BRTH SLV XL